# Patient Record
Sex: FEMALE | Race: WHITE | NOT HISPANIC OR LATINO | Employment: OTHER | URBAN - METROPOLITAN AREA
[De-identification: names, ages, dates, MRNs, and addresses within clinical notes are randomized per-mention and may not be internally consistent; named-entity substitution may affect disease eponyms.]

---

## 2017-09-18 ENCOUNTER — CONSULT (OUTPATIENT)
Dept: ONCOLOGY | Facility: CLINIC | Age: 77
End: 2017-09-18

## 2017-09-18 ENCOUNTER — EDUCATION (OUTPATIENT)
Dept: ONCOLOGY | Facility: HOSPITAL | Age: 77
End: 2017-09-18

## 2017-09-18 ENCOUNTER — DOCUMENTATION (OUTPATIENT)
Dept: OTHER | Facility: HOSPITAL | Age: 77
End: 2017-09-18

## 2017-09-18 VITALS
BODY MASS INDEX: 28.34 KG/M2 | HEIGHT: 62 IN | DIASTOLIC BLOOD PRESSURE: 70 MMHG | WEIGHT: 154 LBS | TEMPERATURE: 97.2 F | SYSTOLIC BLOOD PRESSURE: 150 MMHG | HEART RATE: 64 BPM | RESPIRATION RATE: 15 BRPM

## 2017-09-18 DIAGNOSIS — C34.90 SCLC (SMALL CELL LUNG CARCINOMA), UNSPECIFIED LATERALITY (HCC): Primary | ICD-10-CM

## 2017-09-18 PROBLEM — C34.12 CANCER OF UPPER LOBE OF LEFT LUNG (HCC): Status: ACTIVE | Noted: 2017-09-18

## 2017-09-18 PROCEDURE — 99205 OFFICE O/P NEW HI 60 MIN: CPT | Performed by: INTERNAL MEDICINE

## 2017-09-18 RX ORDER — DIPHENOXYLATE HYDROCHLORIDE AND ATROPINE SULFATE 2.5; .025 MG/1; MG/1
1 TABLET ORAL EVERY 6 HOURS PRN
Qty: 30 TABLET | Refills: 5 | OUTPATIENT
Start: 2017-09-18

## 2017-09-18 RX ORDER — CLOPIDOGREL BISULFATE 75 MG/1
75 TABLET ORAL DAILY
COMMUNITY

## 2017-09-18 RX ORDER — BISOPROLOL FUMARATE 5 MG/1
5 TABLET, FILM COATED ORAL DAILY
COMMUNITY

## 2017-09-18 RX ORDER — CETIRIZINE HYDROCHLORIDE 10 MG/1
10 TABLET ORAL DAILY
COMMUNITY

## 2017-09-18 RX ORDER — GABAPENTIN 100 MG/1
100 CAPSULE ORAL 3 TIMES DAILY
COMMUNITY

## 2017-09-18 RX ORDER — HYDROCHLOROTHIAZIDE 12.5 MG/1
12.5 CAPSULE, GELATIN COATED ORAL DAILY
COMMUNITY
End: 2017-11-20

## 2017-09-18 RX ORDER — SIMVASTATIN 20 MG
20 TABLET ORAL NIGHTLY
COMMUNITY

## 2017-09-18 RX ORDER — ONDANSETRON HYDROCHLORIDE 8 MG/1
8 TABLET, FILM COATED ORAL 3 TIMES DAILY PRN
Qty: 30 TABLET | Refills: 5 | Status: SHIPPED | OUTPATIENT
Start: 2017-09-18

## 2017-09-18 RX ORDER — FESOTERODINE FUMARATE 8 MG/1
TABLET, EXTENDED RELEASE ORAL
COMMUNITY

## 2017-09-18 RX ORDER — ASPIRIN 81 MG/1
81 TABLET ORAL DAILY
COMMUNITY

## 2017-09-18 RX ORDER — LISINOPRIL 40 MG/1
40 TABLET ORAL DAILY
COMMUNITY

## 2017-09-18 NOTE — PROGRESS NOTES
Met with patient, son, daughter-in-law and niece while in Med/Onc clinic. Introduced lung navigator role and provided contact information. She v/u. Previous patient Willie Mitchell was her nephew by marriage. She knows to call with questions or concerns. Nurse navigator available for any future needs.

## 2017-09-18 NOTE — PROGRESS NOTES
DATE OF CONSULTATION: 9/18/2017    REFERRING PHYSICIAN: Lyle Doshi DO    Dear Dr. Lyle Doshi,   Thank you for asking for my medical advice on this patient. I saw her in the  [facility] office on 9/18/2017    REASON FOR CONSULTATION: Left upper lobe small cell lung cancer T1a N0 M0 stage IA disease     HISTORY OF PRESENT ILLNESS: The patient is a very pleasant 76 y.o.  female   who was in her usual state of health until approximately 3 months. The patient presented with cough, nonproductive, associated with mild shortness of breath.  Given her chronic smoking she had CAT scan chest that revealed left upper lobe lung nodule.  This was followed by whole body PET scan that showed hypermetabolic activity in the lung nodule only.  Patient had wedge surgical resection done at [facility] by Dr. Thornton August 19, 2017, final pathology revealed small cell lung cancer with focally positive margin tumor size 1.7 cm.  Level IVR lymph node was negative.  The patient was referred to me for further recommendations.    SUBJECTIVE: When I saw the patient today she's doing fairly well.  She is complaining of mild fatigue.  She any headaches no night sweats she does have bilateral lower extremity edema.    Review of Systems   Constitutional: Positive for fatigue. Negative for activity change, appetite change, chills, fever and unexpected weight change.   HENT: Negative for hearing loss, mouth sores, nosebleeds, sore throat and trouble swallowing.    Eyes: Negative for visual disturbance.   Respiratory: Negative for cough, chest tightness, shortness of breath and wheezing.    Cardiovascular: Negative for chest pain, palpitations and leg swelling.   Gastrointestinal: Negative for abdominal distention, abdominal pain, blood in stool, constipation, diarrhea, nausea, rectal pain and vomiting.   Endocrine: Negative for cold intolerance and heat intolerance.   Genitourinary: Negative for difficulty urinating, dysuria,  frequency and urgency.   Musculoskeletal: Negative for arthralgias, back pain, gait problem, joint swelling and myalgias.   Skin: Negative for rash.   Neurological: Negative for dizziness, tremors, syncope, weakness, light-headedness, numbness and headaches.   Hematological: Negative for adenopathy. Does not bruise/bleed easily.   Psychiatric/Behavioral: Negative for confusion, sleep disturbance and suicidal ideas. The patient is not nervous/anxious.        Past Medical History:   Diagnosis Date   • Arthritis    • COPD (chronic obstructive pulmonary disease)    • Diabetes mellitus    • Hypertension    • Stroke        Social History     Social History   • Marital status:      Spouse name: N/A   • Number of children: N/A   • Years of education: N/A     Occupational History   • Not on file.     Social History Main Topics   • Smoking status: Current Some Day Smoker     Packs/day: 0.50     Years: 50.00     Types: Cigarettes   • Smokeless tobacco: Not on file   • Alcohol use Not on file   • Drug use: Not on file   • Sexual activity: Not on file     Other Topics Concern   • Not on file     Social History Narrative   • No narrative on file       No family history on file.    Past Surgical History:   Procedure Laterality Date   • HYSTERECTOMY  2003   • LUNG SURGERY  2017       No Known Allergies       Current Outpatient Prescriptions:   •  aspirin 81 MG EC tablet, Take 81 mg by mouth Daily., Disp: , Rfl:   •  bisoprolol (ZEBeta) 5 MG tablet, Take 5 mg by mouth Daily., Disp: , Rfl:   •  cetirizine (zyrTEC) 10 MG tablet, Take 10 mg by mouth Daily., Disp: , Rfl:   •  clopidogrel (PLAVIX) 75 MG tablet, Take 75 mg by mouth Daily., Disp: , Rfl:   •  fesoterodine fumarate (TOVIAZ ER) 8 MG tablet sustained-release 24 hour capsule, Take  by mouth Daily., Disp: , Rfl:   •  gabapentin (NEURONTIN) 100 MG capsule, Take 100 mg by mouth 3 (Three) Times a Day., Disp: , Rfl:   •  hydrochlorothiazide (MICROZIDE) 12.5 MG capsule, Take  12.5 mg by mouth Daily., Disp: , Rfl:   •  lisinopril (PRINIVIL,ZESTRIL) 40 MG tablet, Take 40 mg by mouth Daily., Disp: , Rfl:   •  simvastatin (ZOCOR) 20 MG tablet, Take 20 mg by mouth Every Night., Disp: , Rfl:   •  SITagliptin (JANUVIA) 100 MG tablet, Take 100 mg by mouth Daily., Disp: , Rfl:     PHYSICAL EXAMINATION:   /70  Pulse 64  Temp 97.2 °F (36.2 °C) (Temporal Artery )   Resp 15  Wt 154 lb (69.9 kg)   ECOG Performance Status: 1 - Symptomatic but completely ambulatory  General Appearance:  alert, cooperative, no apparent distress and appears stated age   Neurologic/Psychiatric: A&O x 3, gait steady, appropriate affect, strength 5/5 in all muscle groups   HEENT:  Normocephalic, without obvious abnormality, mucous membranes moist   Neck: Supple, symmetrical, trachea midline, no adenopathy;  No thyromegaly, masses, or tenderness   Lungs:   Clear to auscultation bilaterally; respirations regular, even, and unlabored bilaterally   Heart:  Regular rate and rhythm, no murmurs appreciated   Abdomen:   Soft, non-tender, non-distended and no organomegaly   Lymph nodes: No cervical, supraclavicular, inguinal or axillary adenopathy noted   Extremities: Normal, atraumatic; no clubbing, cyanosis, or edema    Skin: No rashes, ulcers, or suspicious lesions noted       No visits with results within 2 Week(s) from this visit.  Latest known visit with results is:    Hospital Outpatient Visit on 09/22/2015   Component Date Value Ref Range Status   • Glucose 09/22/2015 95  70 - 100 mg/dL Final   • WBC 09/22/2015 5.78  3.50 - 10.80 K/mcL Final   • RBC 09/22/2015 3.62* 3.89 - 5.14 M/mcL Final   • Hemoglobin 09/22/2015 11.1* 11.5 - 15.5 g/dL Final   • Hematocrit 09/22/2015 33.8* 34.5 - 44.0 % Final   • MCV 09/22/2015 93.4  80.0 - 99.0 fL Final   • MCH 09/22/2015 30.7  27.0 - 31.0 pg Final   • MCHC 09/22/2015 32.8  32.0 - 36.0 g/dL Final   • RDW-CV 09/22/2015 12.9  11.3 - 14.5 % Final   • Platelets 09/22/2015 150  150 -  450 K/mcL Final   • Glucose 09/22/2015 89  70 - 100 mg/dL Final   • BUN 09/22/2015 14  9 - 23 mg/dL Final   • Creatinine 09/22/2015 1.0  0.6 - 1.3 mg/dL Final   • Sodium 09/22/2015 135  132 - 146 mmol/L Final   • Potassium 09/22/2015 4.0  3.5 - 5.5 mmol/L Final   • Chloride 09/22/2015 97* 99 - 109 mmol/L Final   • CO2 09/22/2015 30  20 - 31 mmol/L Final   • Calcium 09/22/2015 9.7  8.7 - 10.4 mg/dL Final   • Est GFR by Clearance 09/22/2015 Unable to Calculate  ml/min/1.732 Final    Comment:    [employer] Guidelines          Stage         Description                    GFR        1                Normal or High               90+        2                Mild decrease                 60-89        3                Moderate decrease        30-59        4                Severe decrease            15-29        5                Kidney failure                  <15         • Anion Gap 09/22/2015 8  3 - 11 mmol/L Final        No results found.      DIAGNOSTIC DATA:   1. Radiology: Whole body PET scan done July 2017 reviewed by me showed hypermetabolic activity left upper lobe lung nodule   2. Dr. Thornton's note reviewed by me and documented in the chart.   3. Pathology report: August 17, 2017 left upper lobe wedge resection positive for small cell lung cancer chromogranin positive, CD 56 positive, and synaptophysin positive.   4. Laboratory data: As above     ASSESSMENT: The patient is a very pleasant 76 y.o.  female  with stage I a small cell lung cancer    PROBLEM LIST:   1.  Stage I a small cell lung cancer [id number]:  A.  Presented with shortening of breath and dry cough  B.  Whole body PET scan done July 17, 2017 revealed 2 cm hypermetabolic left upper lobe nodule  C.  Status post wedge resection done by Dr. Thornton August 16, 2017 final pathology revealed 1.7 cm small cell carcinoma with focally positive margin and one negative level IVR lymph node.  2.  Type 2 diabetes  3.  Hypertension  4.  Chronic  diarrhea    PLAN:   1. I had a long discussion today with the patient and her family including her son and daughter-in-law about her  new diagnosis of lung cancer. I did go over the final pathology report in  detail with both of them. I reviewed the patient's documents including refereing provider's notes, lab results, imaging, and path report.   2.  I reviewed the patient's PET scan pictures myself.  I shared with the patient and her family that the pathology report had a focal positive margin however that should not be an issue since her pathology showed small cell lung cancer and this is usually not surgically treated disease rather systemic treatment.  3.  The patient would be best treated with concurrent chemotherapy and radiation however given her age as well as comorbidities I really should be a good candidate for combined modality rather I'll start with chemotherapy alone and if she is doing fairly well I will consider adding radiation down the road or after completion chemotherapy.  4.  I will treat the patient with carboplatin and etoposide.  We'll avoid cisplatin given her age as well as fluid retention.  5. We reviewed the potential side effects of this regimen including fatigue, vomiting and nausea, hair loss, nephropathy, neuropathy, hearing loss, myelosuppression, risk of infusion reaction, and potential death.  6.  I'll start patient on Lomotil as needed for diarrhea.  7.  I will order MRI brain to complete her staging workup.  8.  I explained to the patient that she may need to have Port-A-Cath placement.  9.  I offered the patient to be treated closer to home since this is a very basic chemotherapy however she felt more comfortable getting treatment in [place].  She does have a granddaughter who lives here that she can stay with.  The patient will follow me in one week to get started on adjuvant treatment.  Damaris Martin MD  9/18/2017

## 2017-09-18 NOTE — PLAN OF CARE
Outpatient Infusion • [address] • [address] • [place] [address] • [phone]      CHEMOTHERAPY EDUCATION SHEET    NAME:  Nubia Gates      : 1940           DATE: 17    Booklets Given: Chemotherapy and You []  Eating Hints []    Sexuality/Fertility Books []     Chemotherapy/Biotherapy Education Sheets: (list all that apply)  Carboplatin + Etoposide                                                                                                                                                                 Chemotherapy Regimen:Carbo/Etoposide + Neulasta     TOPICS EDUCATION PROVIDED EDUCATION REINFORCED COMMENTS   ANEMIA:  role of RBC, cause, s/s, ways to manage, role of transfusion [x] []    THROMBOCYTOPENIA:  role of platelet, cause, s/s, ways to prevent bleeding, things to avoid, when to seek help [x] [] Discussed thrombocytopenia in relation to carboplatin.    NEUTROPENIA:  role of WBC, cause, infection precautions, s/s of infection, when to call MD [x] [] Discussed role of WBC, hand hygiene and count shaina.    NUTRITION & APPETITE CHANGES:  importance of maintaining healthy diet & weight, ways to manage to improve intake, dietary consult, exercise regimen [x] [] Taste changes   DIARRHEA:  causes, s/s of dehydration, ways to manage, dietary changes, when to call MD [] []    CONSTIPATION:  causes, ways to manage, dietary changes, when to call MD [] []    NAUSEA & VOMITING:  cause, use of antiemetics, dietary changes, when to call MD [x] [] Discussed use of zofran as PRN medication for N/V.    MOUTH SORES:  causes, oral care, ways to manage [x] []    ALOPECIA:  cause, ways to manage, resources [x] []    INFERTILITY & SEXUALITY:  causes, fertility preservation options, sexuality changes, ways to manage, importance of birth control [] []    NERVOUS SYSTEM CHANGES:  causes, s/s, neuropathies, cognitive changes, ways to manage [x] []    PAIN:  causes, ways to manage [] [] ????   SKIN  & NAIL CHANGES:  cause, s/s, ways to manage [x] []    ORGAN TOXICITIES:  cause, s/s, need for diagnostic tests, labs, when to notify MD [x] []    SURVIVORSHIP:  distress, distress assessment, secondary malignancies, early/late effects, follow-up, social issues, social support [] []    HOME CARE:  use of spill kits, storing of PO chemo, how to manage bodily fluids [] []    MISCELLANEOUS:  drug interactions, administration, vesicant, et [x] []        Notes:Spoke with patient and family today in clinic regarding treatment regimen Carbo/Etoposide. Also discussed use of neulasta. Discussed common side effects as listed above. Provided clinic contact number and instructions to call with questions or concerns. Patient to be counseled in infusion room prior to new start as well. Provided written handout materials for patient and family to review. All questions answered.

## 2017-09-20 ENCOUNTER — TELEPHONE (OUTPATIENT)
Dept: ONCOLOGY | Facility: CLINIC | Age: 77
End: 2017-09-20

## 2017-09-20 NOTE — TELEPHONE ENCOUNTER
Lis states that patient is receiving abx and IVF as well while inpatient, and is expected to be admitted for 2-3 days. Advised that we will keep everything as scheduled for now, but they are to call us back if she is still admitted so we can get everything rescheduled.

## 2017-09-20 NOTE — TELEPHONE ENCOUNTER
----- Message from Anastasiya Velásquez sent at 9/20/2017  8:14 AM EDT -----  Regarding: [facility] HOSPITALIZED  Contact: [phone]  AMY PALOMINO, DAUGHTER IN LAW OF PATIENT IS HOSPITALIZED AT [facility]  FOR ACUTE PANCREATITIS.      SHE IS SCHEDULED FOR AN MRI, AND TO FOLLOW UP WITH DR. FABIAN, THEN START HER TREATMENT.     IF SHE GETS DISCHARGED, SHOULD SHE STILL COME FOR THOSE APPOINTMENTS ON MONDAY? WILL SHE STILL START CHEMO? PLEASE CALL AMY AND LET HER KNOW WHAT THEY SHOULD DO.     OR CALL HOME AND LEAVE A MESSAGE [phone]

## 2017-09-25 ENCOUNTER — INFUSION (OUTPATIENT)
Dept: ONCOLOGY | Facility: HOSPITAL | Age: 77
End: 2017-09-25

## 2017-09-25 ENCOUNTER — OFFICE VISIT (OUTPATIENT)
Dept: ONCOLOGY | Facility: CLINIC | Age: 77
End: 2017-09-25

## 2017-09-25 ENCOUNTER — EDUCATION (OUTPATIENT)
Dept: ONCOLOGY | Facility: HOSPITAL | Age: 77
End: 2017-09-25

## 2017-09-25 ENCOUNTER — HOSPITAL ENCOUNTER (OUTPATIENT)
Dept: MRI IMAGING | Facility: HOSPITAL | Age: 77
Discharge: HOME | End: 2017-09-25
Attending: INTERNAL MEDICINE | Admitting: INTERNAL MEDICINE

## 2017-09-25 ENCOUNTER — DOCUMENTATION (OUTPATIENT)
Dept: NUTRITION | Facility: HOSPITAL | Age: 77
End: 2017-09-25

## 2017-09-25 VITALS
WEIGHT: 151 LBS | SYSTOLIC BLOOD PRESSURE: 195 MMHG | HEIGHT: 62 IN | HEART RATE: 58 BPM | TEMPERATURE: 97.5 F | RESPIRATION RATE: 18 BRPM | BODY MASS INDEX: 27.79 KG/M2 | DIASTOLIC BLOOD PRESSURE: 72 MMHG

## 2017-09-25 DIAGNOSIS — C34.90 SCLC (SMALL CELL LUNG CARCINOMA), UNSPECIFIED LATERALITY (HCC): Primary | ICD-10-CM

## 2017-09-25 DIAGNOSIS — C34.90 SCLC (SMALL CELL LUNG CARCINOMA), UNSPECIFIED LATERALITY (HCC): ICD-10-CM

## 2017-09-25 LAB
ALBUMIN SERPL-MCNC: 3.9 G/DL (ref 3.2–4.8)
ALBUMIN/GLOB SERPL: 1.6 G/DL (ref 1.5–2.5)
ALP SERPL-CCNC: 64 U/L (ref 25–100)
ALT SERPL W P-5'-P-CCNC: 13 U/L (ref 7–40)
ANION GAP SERPL CALCULATED.3IONS-SCNC: 4 MMOL/L (ref 3–11)
AST SERPL-CCNC: 19 U/L (ref 0–33)
BASOPHILS # BLD AUTO: 0.01 10*3/MM3 (ref 0–0.2)
BASOPHILS NFR BLD AUTO: 0.1 % (ref 0–1)
BILIRUB SERPL-MCNC: 0.4 MG/DL (ref 0.3–1.2)
BUN BLD-MCNC: 8 MG/DL (ref 9–23)
BUN/CREAT SERPL: 11.4 (ref 7–25)
CALCIUM SPEC-SCNC: 9.2 MG/DL (ref 8.7–10.4)
CHLORIDE SERPL-SCNC: 92 MMOL/L (ref 99–109)
CO2 SERPL-SCNC: 31 MMOL/L (ref 20–31)
CREAT BLD-MCNC: 0.7 MG/DL (ref 0.6–1.3)
CREAT BLDA-MCNC: 0.7 MG/DL (ref 0.6–1.3)
DEPRECATED RDW RBC AUTO: 44.1 FL (ref 37–54)
EOSINOPHIL # BLD AUTO: 0.1 10*3/MM3 (ref 0–0.3)
EOSINOPHIL NFR BLD AUTO: 1.4 % (ref 0–3)
ERYTHROCYTE [DISTWIDTH] IN BLOOD BY AUTOMATED COUNT: 13.6 % (ref 11.3–14.5)
GFR SERPL CREATININE-BSD FRML MDRD: 81 ML/MIN/1.73
GLOBULIN UR ELPH-MCNC: 2.5 GM/DL
GLUCOSE BLD-MCNC: 182 MG/DL (ref 70–100)
HCT VFR BLD AUTO: 34.5 % (ref 34.5–44)
HGB BLD-MCNC: 12.1 G/DL (ref 11.5–15.5)
IMM GRANULOCYTES # BLD: 0.01 10*3/MM3 (ref 0–0.03)
IMM GRANULOCYTES NFR BLD: 0.1 % (ref 0–0.6)
LYMPHOCYTES # BLD AUTO: 1.16 10*3/MM3 (ref 0.6–4.8)
LYMPHOCYTES NFR BLD AUTO: 16.2 % (ref 24–44)
MCH RBC QN AUTO: 31.3 PG (ref 27–31)
MCHC RBC AUTO-ENTMCNC: 35.1 G/DL (ref 32–36)
MCV RBC AUTO: 89.1 FL (ref 80–99)
MONOCYTES # BLD AUTO: 0.65 10*3/MM3 (ref 0–1)
MONOCYTES NFR BLD AUTO: 9.1 % (ref 0–12)
NEUTROPHILS # BLD AUTO: 5.25 10*3/MM3 (ref 1.5–8.3)
NEUTROPHILS NFR BLD AUTO: 73.1 % (ref 41–71)
PLATELET # BLD AUTO: 218 10*3/MM3 (ref 150–450)
PMV BLD AUTO: 8.7 FL (ref 6–12)
POTASSIUM BLD-SCNC: 4 MMOL/L (ref 3.5–5.5)
PROT SERPL-MCNC: 6.4 G/DL (ref 5.7–8.2)
RBC # BLD AUTO: 3.87 10*6/MM3 (ref 3.89–5.14)
SODIUM BLD-SCNC: 127 MMOL/L (ref 132–146)
WBC NRBC COR # BLD: 7.18 10*3/MM3 (ref 3.5–10.8)

## 2017-09-25 PROCEDURE — 25010000002 FOSAPREPITANT PER 1 MG: Performed by: INTERNAL MEDICINE

## 2017-09-25 PROCEDURE — 80053 COMPREHEN METABOLIC PANEL: CPT

## 2017-09-25 PROCEDURE — 96375 TX/PRO/DX INJ NEW DRUG ADDON: CPT

## 2017-09-25 PROCEDURE — 96413 CHEMO IV INFUSION 1 HR: CPT

## 2017-09-25 PROCEDURE — 96366 THER/PROPH/DIAG IV INF ADDON: CPT

## 2017-09-25 PROCEDURE — 25510000001 GADOBENATE DIMEGLUMINE 529 MG/ML SOLUTION: Performed by: INTERNAL MEDICINE

## 2017-09-25 PROCEDURE — 70553 MRI BRAIN STEM W/O & W/DYE: CPT

## 2017-09-25 PROCEDURE — 36415 COLL VENOUS BLD VENIPUNCTURE: CPT

## 2017-09-25 PROCEDURE — 25010000002 CARBOPLATIN PER 50 MG: Performed by: INTERNAL MEDICINE

## 2017-09-25 PROCEDURE — 96415 CHEMO IV INFUSION ADDL HR: CPT

## 2017-09-25 PROCEDURE — 82565 ASSAY OF CREATININE: CPT

## 2017-09-25 PROCEDURE — A9577 INJ MULTIHANCE: HCPCS | Performed by: INTERNAL MEDICINE

## 2017-09-25 PROCEDURE — 25010000002 PALONOSETRON PER 25 MCG: Performed by: INTERNAL MEDICINE

## 2017-09-25 PROCEDURE — 25010000002 ETOPOSIDE 100 MG/5ML SOLUTION 50 ML VIAL: Performed by: INTERNAL MEDICINE

## 2017-09-25 PROCEDURE — 99215 OFFICE O/P EST HI 40 MIN: CPT | Performed by: INTERNAL MEDICINE

## 2017-09-25 PROCEDURE — 96367 TX/PROPH/DG ADDL SEQ IV INF: CPT

## 2017-09-25 PROCEDURE — 25010000002 DEXAMETHASONE PER 1 MG: Performed by: INTERNAL MEDICINE

## 2017-09-25 PROCEDURE — 85025 COMPLETE CBC W/AUTO DIFF WBC: CPT

## 2017-09-25 PROCEDURE — 96417 CHEMO IV INFUS EACH ADDL SEQ: CPT

## 2017-09-25 RX ORDER — PALONOSETRON 0.05 MG/ML
0.25 INJECTION, SOLUTION INTRAVENOUS ONCE
Status: CANCELLED | OUTPATIENT
Start: 2017-09-25

## 2017-09-25 RX ORDER — SODIUM CHLORIDE 9 MG/ML
250 INJECTION, SOLUTION INTRAVENOUS ONCE
Status: DISCONTINUED | OUTPATIENT
Start: 2017-09-25 | End: 2017-09-25 | Stop reason: HOSPADM

## 2017-09-25 RX ORDER — SODIUM CHLORIDE 9 MG/ML
250 INJECTION, SOLUTION INTRAVENOUS ONCE
Status: CANCELLED | OUTPATIENT
Start: 2017-10-23

## 2017-09-25 RX ORDER — SODIUM CHLORIDE 9 MG/ML
250 INJECTION, SOLUTION INTRAVENOUS ONCE
Status: CANCELLED | OUTPATIENT
Start: 2017-09-25

## 2017-09-25 RX ORDER — SODIUM CHLORIDE 9 MG/ML
250 INJECTION, SOLUTION INTRAVENOUS ONCE
Status: CANCELLED | OUTPATIENT
Start: 2017-10-25

## 2017-09-25 RX ORDER — PALONOSETRON 0.05 MG/ML
0.25 INJECTION, SOLUTION INTRAVENOUS ONCE
Status: COMPLETED | OUTPATIENT
Start: 2017-09-25 | End: 2017-09-25

## 2017-09-25 RX ORDER — SODIUM CHLORIDE 9 MG/ML
250 INJECTION, SOLUTION INTRAVENOUS ONCE
Status: CANCELLED | OUTPATIENT
Start: 2017-09-27

## 2017-09-25 RX ORDER — SODIUM CHLORIDE 9 MG/ML
250 INJECTION, SOLUTION INTRAVENOUS ONCE
Status: CANCELLED | OUTPATIENT
Start: 2017-09-26

## 2017-09-25 RX ORDER — PALONOSETRON 0.05 MG/ML
0.25 INJECTION, SOLUTION INTRAVENOUS ONCE
Status: CANCELLED | OUTPATIENT
Start: 2017-10-23

## 2017-09-25 RX ORDER — SODIUM CHLORIDE 9 MG/ML
250 INJECTION, SOLUTION INTRAVENOUS ONCE
Status: CANCELLED | OUTPATIENT
Start: 2017-10-24

## 2017-09-25 RX ADMIN — GADOBENATE DIMEGLUMINE 10 ML: 529 INJECTION, SOLUTION INTRAVENOUS at 08:25

## 2017-09-25 RX ADMIN — DEXAMETHASONE SODIUM PHOSPHATE 12 MG: 4 INJECTION, SOLUTION INTRAMUSCULAR; INTRAVENOUS at 11:56

## 2017-09-25 RX ADMIN — CARBOPLATIN 490 MG: 10 INJECTION, SOLUTION INTRAVENOUS at 13:27

## 2017-09-25 RX ADMIN — PALONOSETRON HYDROCHLORIDE 0.25 MG: 0.25 INJECTION INTRAVENOUS at 11:55

## 2017-09-25 RX ADMIN — SODIUM CHLORIDE 150 MG: 9 INJECTION, SOLUTION INTRAVENOUS at 11:56

## 2017-09-25 RX ADMIN — ETOPOSIDE 170 MG: 20 INJECTION, SOLUTION, CONCENTRATE INTRAVENOUS at 12:21

## 2017-09-25 NOTE — PROGRESS NOTES
DATE OF VISIT: 9/25/2017    REASON FOR VISIT: Followup for limited stage small cell lung cancer     HISTORY OF PRESENT ILLNESS: The patient is a very pleasant 76 y.o. female  with past medical history significant for limited stage small cell lung cancer diagnosed August 19, 2017.  The patient status post wedge resection of left upper lobe mass done by Dr. Thornton, final pathology revealed 1.7 cm small cell lung cancer with positive surgical margin and one negative level IVR lymph node.  The patient was started on adjuvant chemotherapy using carboplatin and etoposide September 25, 2017. The patient is here today for cycle #1, day 1.    SUBJECTIVE: The patient has been doing fairly well. she is able to tolerate  her treatment without any serious side effects. she denied any fever or  chills, no night sweats, denied any headaches    PAST MEDICAL HISTORY/SOCIAL HISTORY/FAMILY HISTORY: Unchanged from my prior documentation done on September 18, 2017    Review of Systems   Constitutional: Negative for activity change, appetite change, chills, fatigue, fever and unexpected weight change.   HENT: Negative for hearing loss, mouth sores, nosebleeds, sore throat and trouble swallowing.    Eyes: Negative for visual disturbance.   Respiratory: Negative for cough, chest tightness, shortness of breath and wheezing.    Cardiovascular: Negative for chest pain, palpitations and leg swelling.   Gastrointestinal: Negative for abdominal distention, abdominal pain, blood in stool, constipation, diarrhea, nausea, rectal pain and vomiting.   Endocrine: Negative for cold intolerance and heat intolerance.   Genitourinary: Negative for difficulty urinating, dysuria, frequency and urgency.   Musculoskeletal: Negative for arthralgias, back pain, gait problem, joint swelling and myalgias.   Skin: Negative for rash.   Neurological: Negative for dizziness, tremors, syncope, weakness, light-headedness, numbness and headaches.   Hematological:  "Negative for adenopathy. Does not bruise/bleed easily.   Psychiatric/Behavioral: Negative for confusion, sleep disturbance and suicidal ideas. The patient is not nervous/anxious.          Current Outpatient Prescriptions:   •  aspirin 81 MG EC tablet, Take 81 mg by mouth Daily., Disp: , Rfl:   •  bisoprolol (ZEBeta) 5 MG tablet, Take 5 mg by mouth Daily., Disp: , Rfl:   •  cetirizine (zyrTEC) 10 MG tablet, Take 10 mg by mouth Daily., Disp: , Rfl:   •  clopidogrel (PLAVIX) 75 MG tablet, Take 75 mg by mouth Daily., Disp: , Rfl:   •  diphenoxylate-atropine (LOMOTIL) 2.5-0.025 MG per tablet, Take 1 tablet by mouth Every 6 (Six) Hours As Needed for Diarrhea. 1 tablet, Disp: 30 tablet, Rfl: 5  •  fesoterodine fumarate (TOVIAZ ER) 8 MG tablet sustained-release 24 hour capsule, Take  by mouth Daily., Disp: , Rfl:   •  gabapentin (NEURONTIN) 100 MG capsule, Take 100 mg by mouth 3 (Three) Times a Day., Disp: , Rfl:   •  hydrochlorothiazide (MICROZIDE) 12.5 MG capsule, Take 12.5 mg by mouth Daily., Disp: , Rfl:   •  lisinopril (PRINIVIL,ZESTRIL) 40 MG tablet, Take 40 mg by mouth Daily., Disp: , Rfl:   •  ondansetron (ZOFRAN) 8 MG tablet, Take 1 tablet by mouth 3 (Three) Times a Day As Needed for Nausea or Vomiting., Disp: 30 tablet, Rfl: 5  •  simvastatin (ZOCOR) 20 MG tablet, Take 20 mg by mouth Every Night., Disp: , Rfl:   •  SITagliptin (JANUVIA) 100 MG tablet, Take 100 mg by mouth Daily., Disp: , Rfl:   No current facility-administered medications for this visit.     PHYSICAL EXAMINATION:   BP (!) 195/72 Comment: LUE  Pulse 58  Temp 97.5 °F (36.4 °C) (Temporal Artery )   Resp 18  Ht 62\" (157.5 cm)  Wt 151 lb (68.5 kg)  BMI 27.62 kg/m2   ECOG Performance Status: 0 - Asymptomatic  General Appearance:  alert, cooperative, no apparent distress and appears stated age   Neurologic/Psychiatric: A&O x 3, gait steady, appropriate affect, strength 5/5 in all muscle groups   HEENT:  Normocephalic, without obvious abnormality, " mucous membranes moist   Neck: Supple, symmetrical, trachea midline, no adenopathy;  No thyromegaly, masses, or tenderness   Lungs:   Clear to auscultation bilaterally; respirations regular, even, and unlabored bilaterally   Heart:  Regular rate and rhythm, no murmurs appreciated   Abdomen:   Soft, non-tender, non-distended and no organomegaly   Lymph nodes: No cervical, supraclavicular, inguinal or axillary adenopathy noted   Extremities: Normal, atraumatic; no clubbing, cyanosis, or edema    Skin: No rashes, ulcers, or suspicious lesions noted     Infusion on 09/25/2017   Component Date Value Ref Range Status   • WBC 09/25/2017 7.18  3.50 - 10.80 10*3/mm3 Final   • RBC 09/25/2017 3.87* 3.89 - 5.14 10*6/mm3 Final   • Hemoglobin 09/25/2017 12.1  11.5 - 15.5 g/dL Final   • Hematocrit 09/25/2017 34.5  34.5 - 44.0 % Final   • MCV 09/25/2017 89.1  80.0 - 99.0 fL Final   • MCH 09/25/2017 31.3* 27.0 - 31.0 pg Final   • MCHC 09/25/2017 35.1  32.0 - 36.0 g/dL Final   • RDW 09/25/2017 13.6  11.3 - 14.5 % Final   • RDW-SD 09/25/2017 44.1  37.0 - 54.0 fl Final   • MPV 09/25/2017 8.7  6.0 - 12.0 fL Final   • Platelets 09/25/2017 218  150 - 450 10*3/mm3 Final   • Neutrophil % 09/25/2017 73.1* 41.0 - 71.0 % Final   • Lymphocyte % 09/25/2017 16.2* 24.0 - 44.0 % Final   • Monocyte % 09/25/2017 9.1  0.0 - 12.0 % Final   • Eosinophil % 09/25/2017 1.4  0.0 - 3.0 % Final   • Basophil % 09/25/2017 0.1  0.0 - 1.0 % Final   • Immature Grans % 09/25/2017 0.1  0.0 - 0.6 % Final   • Neutrophils, Absolute 09/25/2017 5.25  1.50 - 8.30 10*3/mm3 Final   • Lymphocytes, Absolute 09/25/2017 1.16  0.60 - 4.80 10*3/mm3 Final   • Monocytes, Absolute 09/25/2017 0.65  0.00 - 1.00 10*3/mm3 Final   • Eosinophils, Absolute 09/25/2017 0.10  0.00 - 0.30 10*3/mm3 Final   • Basophils, Absolute 09/25/2017 0.01  0.00 - 0.20 10*3/mm3 Final   • Immature Grans, Absolute 09/25/2017 0.01  0.00 - 0.03 10*3/mm3 Final        No results found.    ASSESSMENT: The patient  is a very pleasant 76 y.o. female  with Limited stage small cell lung cancer    PROBLEM LIST:  1.Stage I a small cell lung cancer [id number]:  A.  Presented with shortening of breath and dry cough  B.  Whole body PET scan done July 17, 2017 revealed 2 cm hypermetabolic left upper lobe nodule  C.  Status post wedge resection done by Dr. Thornton August 16, 2017 pathology revealed 1.7 cm small cell carcinoma with focally positive margin and one negative level IVR lymph node.  D. started adjuvant chemotherapy with carboplatin and etoposide September 25, 2017  2.  Type 2 diabetes  3.  Hypertension  4.  Chronic diarrhea     PLAN:  1.  I would proceed with chemotherapy as scheduled today.  2.  The patient will follow up with me in 4 weeks for cycle #2 out of 4 planned.  3.  I'll monitor patient blood work including blood counts kidney failure function.  4. We reviewed the potential side effects of this regimen including fatigue, vomiting and nausea, hair loss, nephropathy, neuropathy, hearing loss, myelosuppression, risk of infusion reaction, and potential death.  5.  I will start patient on Zofran as needed for chemotherapy-induced nausea.  6.  I'll repeat her scans after completion of chemotherapy to consider adjuvant radiation at that point.  Patient is not a candidate for concurrent treatment modality given her age as well as borderline performance status.  7.  I'll consider restarting patient on Lasix if her lower extremity edema to worsen.  8.  I did go over the MRI results with the patient and her family, reviewed the films myself, there is no evidence of metastatic disease.  I'll follow-up on the official read by the radiologist.  9.  We'll consider inserting Port-A-Cath if needed.  10.  We'll continue Imodium as well as Lomotil as needed for diarrhea.    Damaris Martin MD  9/25/2017

## 2017-09-25 NOTE — PROGRESS NOTES
Oncology Nutrition Screening    Patient Name:  Nubia Gates  YOB: 1940  MRN: [id number]  Date:  09/25/17  Physician:  Dr. Martin    Type of Cancer Treatment:   Surgery:   Wedge resection 8/16/17  Chemotherapy:  Carbo(D1) / VP16(D1-3) - every 28 days x 4 cycles    Patient Active Problem List   Diagnosis   • Cancer of upper lobe of left lung   • SCLC (small cell lung carcinoma)       Current Outpatient Prescriptions   Medication Sig Dispense Refill   • aspirin 81 MG EC tablet Take 81 mg by mouth Daily.     • bisoprolol (ZEBeta) 5 MG tablet Take 5 mg by mouth Daily.     • cetirizine (zyrTEC) 10 MG tablet Take 10 mg by mouth Daily.     • clopidogrel (PLAVIX) 75 MG tablet Take 75 mg by mouth Daily.     • diphenoxylate-atropine (LOMOTIL) 2.5-0.025 MG per tablet Take 1 tablet by mouth Every 6 (Six) Hours As Needed for Diarrhea. 1 tablet 30 tablet 5   • fesoterodine fumarate (TOVIAZ ER) 8 MG tablet sustained-release 24 hour capsule Take  by mouth Daily.     • gabapentin (NEURONTIN) 100 MG capsule Take 100 mg by mouth 3 (Three) Times a Day.     • hydrochlorothiazide (MICROZIDE) 12.5 MG capsule Take 12.5 mg by mouth Daily.     • lisinopril (PRINIVIL,ZESTRIL) 40 MG tablet Take 40 mg by mouth Daily.     • ondansetron (ZOFRAN) 8 MG tablet Take 1 tablet by mouth 3 (Three) Times a Day As Needed for Nausea or Vomiting. 30 tablet 5   • simvastatin (ZOCOR) 20 MG tablet Take 20 mg by mouth Every Night.     • SITagliptin (JANUVIA) 100 MG tablet Take 100 mg by mouth Daily.       No current facility-administered medications for this visit.      Facility-Administered Medications Ordered in Other Visits   Medication Dose Route Frequency Provider Last Rate Last Dose   • CARBOplatin (PARAPLATIN) 490 mg in sodium chloride 0.9 % 250 mL chemo IVPB  490 mg Intravenous Once Damaris Martin MD       • etoposide (TOPOSAR) 170 mg in sodium chloride 0.9 % 500 mL chemo IVPB  100 mg/m2 (Treatment Plan Recorded) Intravenous Once Damaris NAVA  MD Veronica   170 mg at 09/25/17 1221   • sodium chloride 0.9 % infusion 250 mL  250 mL Intravenous Once Damaris Martin MD           Glycemic Risk:   NIDDM    Weight:   Height: 62 inches  Weight: 151 lbs.  Usual Body Weight: ~150 lbs.   BMI: 27.6  Overweight  Weight - no significant changes    Oral Food Intake:  Regular Diet - No Restrictions    Hydration Status:   How many 8 ounce glass of water of fluid do you drink per day?  Specific amount not assessed at this time.    Enteral Feeding:   n/a    Nutrition Symptoms:   Altered Apetite  Nausea  Constipation  Diarrhea    Activity:   Normal with no limitations     reports that she has been smoking Cigarettes.  She has a 25.00 pack-year smoking history. She has never used smokeless tobacco. She reports that she does not drink alcohol or use illicit drugs.    Evaluation of Nutritional Risk:   Patient has been identified at nutritional risk due to diagnosis, treatment plan, and nutrition impact symptoms.  Met with patient and her daughter in law during her initial chemotherapy infusion appointment.  They report her appetite/oral intake has been somewhat decreased; they state she has had an increase in nausea and sometimes vomiting; and they report she fluctuates between constipation and diarrhea with all of these symptoms starting about 1 year ago.  Note her weight has remained stable.      Discussed the importance of good nutrition during her treatment course focusing on adequate calorie, protein, nutrient and fluid intake to maintain her nutritional status.  Recommended she be consuming smaller more frequent meals/snacks throughout the day to aid with nausea management.  Emphasized the importance of protein and its role in the diet; reviewed high protein foods; and recommended she have a protein source at each meal/snack.  Also emphasized the importance of hydration; reviewed good hydrating fluid options; and recommended she increase her consumption of hydrating fluids.   "Discussed nutritional supplements and their role in the diet and provided her with samples and coupons for Boost and Ensure products, plus a recipe guide.  Provided the new chemo patient packet and reviewed the ACS nutrition booklet and suggested they use if for symptom management as needed.  Also provided written diet material \"Soft and Moist High Protein Menu Ideas\".    Answered their questions and both voiced understanding of information discussed.  RD's contact information provided and encouraged them to call if further questions or concerns arise.  Will monitor as needed during her treatment course.      Electronically signed by:  Zulma Acuna RD  1:19 PM  "

## 2017-09-25 NOTE — PLAN OF CARE
Outpatient Infusion • [address] [facility] • [address] • [place] [address] • [phone]      CHEMOTHERAPY EDUCATION SHEET    NAME:  Nubia Gates      : 1940           DATE: 17    Booklets Given: Chemotherapy and You []  Eating Hints []    Sexuality/Fertility Books []     Chemotherapy/Biotherapy Education Sheets: (list all that apply)  Carboplatin + Etoposide                                                                                                                                                                 Chemotherapy Regimen:Carbo/Etoposide + Neulasta     TOPICS EDUCATION PROVIDED EDUCATION REINFORCED COMMENTS   ANEMIA:  role of RBC, cause, s/s, ways to manage, role of transfusion [x] []    THROMBOCYTOPENIA:  role of platelet, cause, s/s, ways to prevent bleeding, things to avoid, when to seek help [x] [] Discussed thrombocytopenia in relation to carboplatin.    NEUTROPENIA:  role of WBC, cause, infection precautions, s/s of infection, when to call MD [x] [] Discussed role of WBC, hand hygiene and count shaina.    NUTRITION & APPETITE CHANGES:  importance of maintaining healthy diet & weight, ways to manage to improve intake, dietary consult, exercise regimen [x] [] Taste changes   DIARRHEA:  causes, s/s of dehydration, ways to manage, dietary changes, when to call MD [] []    CONSTIPATION:  causes, ways to manage, dietary changes, when to call MD [] []    NAUSEA & VOMITING:  cause, use of antiemetics, dietary changes, when to call MD [x] [] Discussed use of zofran as PRN medication for N/V.    MOUTH SORES:  causes, oral care, ways to manage [x] []    ALOPECIA:  cause, ways to manage, resources [x] []    INFERTILITY & SEXUALITY:  causes, fertility preservation options, sexuality changes, ways to manage, importance of birth control [] []    NERVOUS SYSTEM CHANGES:  causes, s/s, neuropathies, cognitive changes, ways to manage [x] []    PAIN:  causes, ways to manage [] [] ????   SKIN  & NAIL CHANGES:  cause, s/s, ways to manage [x] []    ORGAN TOXICITIES:  cause, s/s, need for diagnostic tests, labs, when to notify MD [x] []    SURVIVORSHIP:  distress, distress assessment, secondary malignancies, early/late effects, follow-up, social issues, social support [] []    HOME CARE:  use of spill kits, storing of PO chemo, how to manage bodily fluids [] []    MISCELLANEOUS:  drug interactions, administration, vesicant, et [x] []        Notes:Spoke with patient and family today in infusion regarding the initiation of treatment. Carbo/Etoposide.Discussed common side effects as listed above. Provided clinic contact number and instructions to call with questions or concerns.  Provided written handout materials for patient and family to review. All questions answered.

## 2017-09-26 ENCOUNTER — DOCUMENTATION (OUTPATIENT)
Dept: SOCIAL WORK | Facility: HOSPITAL | Age: 77
End: 2017-09-26

## 2017-09-26 ENCOUNTER — INFUSION (OUTPATIENT)
Dept: ONCOLOGY | Facility: HOSPITAL | Age: 77
End: 2017-09-26

## 2017-09-26 VITALS
RESPIRATION RATE: 18 BRPM | BODY MASS INDEX: 28.34 KG/M2 | HEIGHT: 62 IN | SYSTOLIC BLOOD PRESSURE: 147 MMHG | WEIGHT: 154 LBS | TEMPERATURE: 98.1 F | HEART RATE: 81 BPM | DIASTOLIC BLOOD PRESSURE: 61 MMHG

## 2017-09-26 DIAGNOSIS — C34.90 SCLC (SMALL CELL LUNG CARCINOMA), UNSPECIFIED LATERALITY (HCC): Primary | ICD-10-CM

## 2017-09-26 LAB — CREAT BLDA-MCNC: 0.7 MG/DL (ref 0.6–1.3)

## 2017-09-26 PROCEDURE — 96413 CHEMO IV INFUSION 1 HR: CPT

## 2017-09-26 PROCEDURE — 96375 TX/PRO/DX INJ NEW DRUG ADDON: CPT

## 2017-09-26 PROCEDURE — 25010000002 ETOPOSIDE 100 MG/5ML SOLUTION 50 ML VIAL: Performed by: INTERNAL MEDICINE

## 2017-09-26 PROCEDURE — 25010000002 DEXAMETHASONE PER 1 MG: Performed by: INTERNAL MEDICINE

## 2017-09-26 RX ORDER — SODIUM CHLORIDE 9 MG/ML
250 INJECTION, SOLUTION INTRAVENOUS ONCE
Status: COMPLETED | OUTPATIENT
Start: 2017-09-26 | End: 2017-09-26

## 2017-09-26 RX ADMIN — DEXAMETHASONE SODIUM PHOSPHATE 12 MG: 4 INJECTION, SOLUTION INTRAMUSCULAR; INTRAVENOUS at 13:23

## 2017-09-26 RX ADMIN — ETOPOSIDE 170 MG: 20 INJECTION, SOLUTION, CONCENTRATE INTRAVENOUS at 13:41

## 2017-09-26 RX ADMIN — SODIUM CHLORIDE 250 ML: 9 INJECTION, SOLUTION INTRAVENOUS at 13:23

## 2017-09-26 NOTE — PROGRESS NOTES
SW met with pt during first infusion to provide support and resources and to address distress screening score of 10.  Pt states that she was very nervous about her appointment today but that she feels much better now that she is here and that her treatment is underway.  Pt states that she did not know what to expect from her chemo treatment and this caused her much anxiety.  SW provided support to pt and educated pt about oncology social work services.  Pt is from the [facility], but stayed with her daughter in [place] last night so that she could avoid have to get up so early.  SW provided contact information for future needs or concerns.  SW available for ongoing support and resource needs.

## 2017-09-27 ENCOUNTER — TELEPHONE (OUTPATIENT)
Dept: ONCOLOGY | Facility: CLINIC | Age: 77
End: 2017-09-27

## 2017-09-27 ENCOUNTER — INFUSION (OUTPATIENT)
Dept: ONCOLOGY | Facility: HOSPITAL | Age: 77
End: 2017-09-27

## 2017-09-27 VITALS
RESPIRATION RATE: 16 BRPM | DIASTOLIC BLOOD PRESSURE: 75 MMHG | HEART RATE: 73 BPM | WEIGHT: 154 LBS | TEMPERATURE: 97.4 F | HEIGHT: 62 IN | SYSTOLIC BLOOD PRESSURE: 191 MMHG | BODY MASS INDEX: 28.34 KG/M2

## 2017-09-27 DIAGNOSIS — C34.90 SCLC (SMALL CELL LUNG CARCINOMA), UNSPECIFIED LATERALITY (HCC): Primary | ICD-10-CM

## 2017-09-27 PROCEDURE — 25010000002 PEGFILGRASTIM 6 MG/0.6ML PREFILLED SYRINGE KIT: Performed by: INTERNAL MEDICINE

## 2017-09-27 PROCEDURE — 25010000002 DEXAMETHASONE PER 1 MG: Performed by: INTERNAL MEDICINE

## 2017-09-27 PROCEDURE — 96377 APPLICATON ON-BODY INJECTOR: CPT

## 2017-09-27 PROCEDURE — 96413 CHEMO IV INFUSION 1 HR: CPT

## 2017-09-27 PROCEDURE — 96375 TX/PRO/DX INJ NEW DRUG ADDON: CPT

## 2017-09-27 PROCEDURE — 25010000002 ETOPOSIDE 100 MG/5ML SOLUTION 50 ML VIAL: Performed by: INTERNAL MEDICINE

## 2017-09-27 RX ORDER — SODIUM CHLORIDE 9 MG/ML
250 INJECTION, SOLUTION INTRAVENOUS ONCE
Status: COMPLETED | OUTPATIENT
Start: 2017-09-27 | End: 2017-09-27

## 2017-09-27 RX ADMIN — PEGFILGRASTIM 6 MG: KIT SUBCUTANEOUS at 15:29

## 2017-09-27 RX ADMIN — SODIUM CHLORIDE 250 ML: 9 INJECTION, SOLUTION INTRAVENOUS at 13:41

## 2017-09-27 RX ADMIN — ETOPOSIDE 170 MG: 20 INJECTION, SOLUTION, CONCENTRATE INTRAVENOUS at 14:13

## 2017-09-27 RX ADMIN — DEXAMETHASONE SODIUM PHOSPHATE 12 MG: 4 INJECTION, SOLUTION INTRAMUSCULAR; INTRAVENOUS at 13:43

## 2017-09-27 NOTE — TELEPHONE ENCOUNTER
----- Message from Jia Chairez RN sent at 9/27/2017  3:26 PM EDT -----  Regarding: /ELROY  PT's sats at resting 88% and walking 83%. Pls call, [phone] [phone]

## 2017-09-27 NOTE — PROGRESS NOTES
Pt reported SOA after treatment. Pt's O2 saturation sitting 88% and O2 saturation walking 83%. Reported to , home oxygen being ordered.

## 2017-10-02 ENCOUNTER — TELEPHONE (OUTPATIENT)
Dept: ONCOLOGY | Facility: CLINIC | Age: 77
End: 2017-10-02

## 2017-10-02 NOTE — TELEPHONE ENCOUNTER
----- Message from Arabella BARKER Dago sent at 10/2/2017 12:34 PM EDT -----  Regarding: CAREN-ORDER  Contact: [phone]  Lis patient's daughter in law called and said [employer] company called and said they have to have an order signed by Dr. Martin not the nurse for insurance purposes it also needs to say she had COPD and to add  A humidifier kit to it as well. This can be faxed to [phone].

## 2017-10-23 ENCOUNTER — LAB (OUTPATIENT)
Dept: LAB | Facility: HOSPITAL | Age: 77
End: 2017-10-23

## 2017-10-23 ENCOUNTER — OFFICE VISIT (OUTPATIENT)
Dept: ONCOLOGY | Facility: CLINIC | Age: 77
End: 2017-10-23

## 2017-10-23 ENCOUNTER — INFUSION (OUTPATIENT)
Dept: ONCOLOGY | Facility: HOSPITAL | Age: 77
End: 2017-10-23

## 2017-10-23 VITALS
DIASTOLIC BLOOD PRESSURE: 70 MMHG | HEIGHT: 62 IN | SYSTOLIC BLOOD PRESSURE: 145 MMHG | RESPIRATION RATE: 18 BRPM | HEART RATE: 75 BPM | WEIGHT: 148 LBS | TEMPERATURE: 97.8 F | BODY MASS INDEX: 27.23 KG/M2

## 2017-10-23 DIAGNOSIS — C34.90 SCLC (SMALL CELL LUNG CARCINOMA), UNSPECIFIED LATERALITY (HCC): Primary | ICD-10-CM

## 2017-10-23 DIAGNOSIS — C34.90 SCLC (SMALL CELL LUNG CARCINOMA), UNSPECIFIED LATERALITY (HCC): ICD-10-CM

## 2017-10-23 DIAGNOSIS — C34.90 SMALL CELL CARCINOMA OF LUNG, UNSPECIFIED LATERALITY: Primary | ICD-10-CM

## 2017-10-23 LAB
ALBUMIN SERPL-MCNC: 3.6 G/DL (ref 3.2–4.8)
ALBUMIN/GLOB SERPL: 1.4 G/DL (ref 1.5–2.5)
ALP SERPL-CCNC: 69 U/L (ref 25–100)
ALT SERPL W P-5'-P-CCNC: 7 U/L (ref 7–40)
ANION GAP SERPL CALCULATED.3IONS-SCNC: 4 MMOL/L (ref 3–11)
AST SERPL-CCNC: 12 U/L (ref 0–33)
BILIRUB SERPL-MCNC: 0.3 MG/DL (ref 0.3–1.2)
BUN BLD-MCNC: 8 MG/DL (ref 9–23)
BUN/CREAT SERPL: 10 (ref 7–25)
CALCIUM SPEC-SCNC: 8.9 MG/DL (ref 8.7–10.4)
CHLORIDE SERPL-SCNC: 102 MMOL/L (ref 99–109)
CO2 SERPL-SCNC: 31 MMOL/L (ref 20–31)
CREAT BLD-MCNC: 0.8 MG/DL (ref 0.6–1.3)
ERYTHROCYTE [DISTWIDTH] IN BLOOD BY AUTOMATED COUNT: 16.2 % (ref 11.3–14.5)
GFR SERPL CREATININE-BSD FRML MDRD: 70 ML/MIN/1.73
GLOBULIN UR ELPH-MCNC: 2.6 GM/DL
GLUCOSE BLD-MCNC: 175 MG/DL (ref 70–100)
HCT VFR BLD AUTO: 30.9 % (ref 34.5–44)
HGB BLD-MCNC: 9.7 G/DL (ref 11.5–15.5)
LYMPHOCYTES # BLD AUTO: 1.4 10*3/MM3 (ref 0.6–4.8)
LYMPHOCYTES NFR BLD AUTO: 16.6 % (ref 24–44)
MCH RBC QN AUTO: 29.2 PG (ref 27–31)
MCHC RBC AUTO-ENTMCNC: 31.4 G/DL (ref 32–36)
MCV RBC AUTO: 92.9 FL (ref 80–99)
MONOCYTES # BLD AUTO: 0.2 10*3/MM3 (ref 0–1)
MONOCYTES NFR BLD AUTO: 3 % (ref 0–12)
NEUTROPHILS # BLD AUTO: 6.6 10*3/MM3 (ref 1.5–8.3)
NEUTROPHILS NFR BLD AUTO: 80.4 % (ref 41–71)
PLATELET # BLD AUTO: 523 10*3/MM3 (ref 150–450)
PMV BLD AUTO: 6.7 FL (ref 6–12)
POTASSIUM BLD-SCNC: 3.8 MMOL/L (ref 3.5–5.5)
PROT SERPL-MCNC: 6.2 G/DL (ref 5.7–8.2)
RBC # BLD AUTO: 3.33 10*6/MM3 (ref 3.89–5.14)
SODIUM BLD-SCNC: 137 MMOL/L (ref 132–146)
WBC NRBC COR # BLD: 8.2 10*3/MM3 (ref 3.5–10.8)

## 2017-10-23 PROCEDURE — 25010000002 FOSAPREPITANT PER 1 MG: Performed by: INTERNAL MEDICINE

## 2017-10-23 PROCEDURE — 96367 TX/PROPH/DG ADDL SEQ IV INF: CPT

## 2017-10-23 PROCEDURE — 96375 TX/PRO/DX INJ NEW DRUG ADDON: CPT

## 2017-10-23 PROCEDURE — 25010000002 PALONOSETRON PER 25 MCG: Performed by: INTERNAL MEDICINE

## 2017-10-23 PROCEDURE — 25010000002 DEXAMETHASONE PER 1 MG: Performed by: INTERNAL MEDICINE

## 2017-10-23 PROCEDURE — 25010000002 CARBOPLATIN PER 50 MG: Performed by: INTERNAL MEDICINE

## 2017-10-23 PROCEDURE — 96417 CHEMO IV INFUS EACH ADDL SEQ: CPT

## 2017-10-23 PROCEDURE — 36415 COLL VENOUS BLD VENIPUNCTURE: CPT

## 2017-10-23 PROCEDURE — 80053 COMPREHEN METABOLIC PANEL: CPT | Performed by: INTERNAL MEDICINE

## 2017-10-23 PROCEDURE — 85025 COMPLETE CBC W/AUTO DIFF WBC: CPT

## 2017-10-23 PROCEDURE — 99215 OFFICE O/P EST HI 40 MIN: CPT | Performed by: INTERNAL MEDICINE

## 2017-10-23 PROCEDURE — 96376 TX/PRO/DX INJ SAME DRUG ADON: CPT

## 2017-10-23 PROCEDURE — 96366 THER/PROPH/DIAG IV INF ADDON: CPT

## 2017-10-23 PROCEDURE — 25010000002 ETOPOSIDE 100 MG/5ML SOLUTION 50 ML VIAL: Performed by: INTERNAL MEDICINE

## 2017-10-23 PROCEDURE — 96413 CHEMO IV INFUSION 1 HR: CPT

## 2017-10-23 RX ORDER — IBUPROFEN 800 MG/1
TABLET ORAL
COMMUNITY
Start: 2017-10-10

## 2017-10-23 RX ORDER — PALONOSETRON 0.05 MG/ML
0.25 INJECTION, SOLUTION INTRAVENOUS ONCE
Status: COMPLETED | OUTPATIENT
Start: 2017-10-23 | End: 2017-10-23

## 2017-10-23 RX ORDER — SODIUM CHLORIDE 9 MG/ML
250 INJECTION, SOLUTION INTRAVENOUS ONCE
Status: COMPLETED | OUTPATIENT
Start: 2017-10-23 | End: 2017-10-23

## 2017-10-23 RX ORDER — OMEPRAZOLE 40 MG/1
CAPSULE, DELAYED RELEASE ORAL
COMMUNITY
Start: 2017-10-12

## 2017-10-23 RX ADMIN — ETOPOSIDE 170 MG: 20 INJECTION, SOLUTION, CONCENTRATE INTRAVENOUS at 12:17

## 2017-10-23 RX ADMIN — SODIUM CHLORIDE 150 MG: 9 INJECTION, SOLUTION INTRAVENOUS at 11:39

## 2017-10-23 RX ADMIN — DEXAMETHASONE SODIUM PHOSPHATE 12 MG: 4 INJECTION, SOLUTION INTRAMUSCULAR; INTRAVENOUS at 11:39

## 2017-10-23 RX ADMIN — CARBOPLATIN 440 MG: 10 INJECTION, SOLUTION INTRAVENOUS at 13:25

## 2017-10-23 RX ADMIN — SODIUM CHLORIDE 250 ML: 9 INJECTION, SOLUTION INTRAVENOUS at 11:35

## 2017-10-23 RX ADMIN — PALONOSETRON HYDROCHLORIDE 0.25 MG: 0.25 INJECTION INTRAVENOUS at 11:35

## 2017-10-23 NOTE — PROGRESS NOTES
DATE OF VISIT: 10/23/2017    REASON FOR VISIT: Followup for limited stage small cell lung cancer     HISTORY OF PRESENT ILLNESS: The patient is a very pleasant 77 y.o. female  with past medical history significant for limited stage small cell lung cancer diagnosed August 19, 2017.  The patient status post wedge resection of left upper lobe mass done by Dr. Thornton, final pathology revealed 1.7 cm small cell lung cancer with positive surgical margin and one negative level IVR lymph node.  The patient was started on adjuvant chemotherapy using carboplatin and etoposide September 25, 2017. The patient is here today for cycle #2, day 1.    SUBJECTIVE: The patient has been doing fairly well. she is able to tolerate  her treatment without any serious side effects. she denied any fever or  chills, no night sweats, denied any headaches    PAST MEDICAL HISTORY/SOCIAL HISTORY/FAMILY HISTORY: Unchanged from my prior documentation done on September 18, 2017    Review of Systems   Constitutional: Negative for activity change, appetite change, chills, fatigue, fever and unexpected weight change.   HENT: Negative for hearing loss, mouth sores, nosebleeds, sore throat and trouble swallowing.    Eyes: Negative for visual disturbance.   Respiratory: Negative for cough, chest tightness, shortness of breath and wheezing.    Cardiovascular: Negative for chest pain, palpitations and leg swelling.   Gastrointestinal: Negative for abdominal distention, abdominal pain, blood in stool, constipation, diarrhea, nausea, rectal pain and vomiting.   Endocrine: Negative for cold intolerance and heat intolerance.   Genitourinary: Negative for difficulty urinating, dysuria, frequency and urgency.   Musculoskeletal: Negative for arthralgias, back pain, gait problem, joint swelling and myalgias.   Skin: Negative for rash.   Neurological: Negative for dizziness, tremors, syncope, weakness, light-headedness, numbness and headaches.   Hematological:  "Negative for adenopathy. Does not bruise/bleed easily.   Psychiatric/Behavioral: Negative for confusion, sleep disturbance and suicidal ideas. The patient is not nervous/anxious.          Current Outpatient Prescriptions:   •  Lansoprazole (PREVACID PO), Take  by mouth., Disp: , Rfl:   •  aspirin 81 MG EC tablet, Take 81 mg by mouth Daily., Disp: , Rfl:   •  bisoprolol (ZEBeta) 5 MG tablet, Take 5 mg by mouth Daily., Disp: , Rfl:   •  cetirizine (zyrTEC) 10 MG tablet, Take 10 mg by mouth Daily., Disp: , Rfl:   •  clopidogrel (PLAVIX) 75 MG tablet, Take 75 mg by mouth Daily., Disp: , Rfl:   •  diphenoxylate-atropine (LOMOTIL) 2.5-0.025 MG per tablet, Take 1 tablet by mouth Every 6 (Six) Hours As Needed for Diarrhea. 1 tablet, Disp: 30 tablet, Rfl: 5  •  fesoterodine fumarate (TOVIAZ ER) 8 MG tablet sustained-release 24 hour capsule, Take  by mouth Daily., Disp: , Rfl:   •  gabapentin (NEURONTIN) 100 MG capsule, Take 100 mg by mouth 3 (Three) Times a Day., Disp: , Rfl:   •  hydrochlorothiazide (MICROZIDE) 12.5 MG capsule, Take 12.5 mg by mouth Daily., Disp: , Rfl:   •  ibuprofen (ADVIL,MOTRIN) 800 MG tablet, , Disp: , Rfl:   •  lisinopril (PRINIVIL,ZESTRIL) 40 MG tablet, Take 40 mg by mouth Daily., Disp: , Rfl:   •  omeprazole (priLOSEC) 40 MG capsule, , Disp: , Rfl:   •  ondansetron (ZOFRAN) 8 MG tablet, Take 1 tablet by mouth 3 (Three) Times a Day As Needed for Nausea or Vomiting., Disp: 30 tablet, Rfl: 5  •  simvastatin (ZOCOR) 20 MG tablet, Take 20 mg by mouth Every Night., Disp: , Rfl:   •  SITagliptin (JANUVIA) 100 MG tablet, Take 100 mg by mouth Daily., Disp: , Rfl:     PHYSICAL EXAMINATION:   /70 Comment: RUE  Pulse 75  Temp 97.8 °F (36.6 °C) (Temporal Artery )   Resp 18  Ht 62\" (157.5 cm)  Wt 148 lb (67.1 kg)  BMI 27.07 kg/m2   ECOG Performance Status: 0 - Asymptomatic  General Appearance:  alert, cooperative, no apparent distress and appears stated age   Neurologic/Psychiatric: A&O x 3, gait " steady, appropriate affect, strength 5/5 in all muscle groups   HEENT:  Normocephalic, without obvious abnormality, mucous membranes moist   Neck: Supple, symmetrical, trachea midline, no adenopathy;  No thyromegaly, masses, or tenderness   Lungs:   Clear to auscultation bilaterally; respirations regular, even, and unlabored bilaterally   Heart:  Regular rate and rhythm, no murmurs appreciated   Abdomen:   Soft, non-tender, non-distended and no organomegaly   Lymph nodes: No cervical, supraclavicular, inguinal or axillary adenopathy noted   Extremities: Normal, atraumatic; no clubbing, cyanosis, or edema    Skin: No rashes, ulcers, or suspicious lesions noted     Lab on 10/23/2017   Component Date Value Ref Range Status   • WBC 10/23/2017 8.20  3.50 - 10.80 10*3/mm3 Final   • RBC 10/23/2017 3.33* 3.89 - 5.14 10*6/mm3 Final   • Hemoglobin 10/23/2017 9.7* 11.5 - 15.5 g/dL Final   • Hematocrit 10/23/2017 30.9* 34.5 - 44.0 % Final   • RDW 10/23/2017 16.2* 11.3 - 14.5 % Final   • MCV 10/23/2017 92.9  80.0 - 99.0 fL Final   • MCH 10/23/2017 29.2  27.0 - 31.0 pg Final   • MCHC 10/23/2017 31.4* 32.0 - 36.0 g/dL Final   • MPV 10/23/2017 6.7  6.0 - 12.0 fL Final   • Platelets 10/23/2017 523* 150 - 450 10*3/mm3 Final   • Neutrophil % 10/23/2017 80.4* 41.0 - 71.0 % Final   • Lymphocyte % 10/23/2017 16.6* 24.0 - 44.0 % Final   • Monocyte % 10/23/2017 3.0  0.0 - 12.0 % Final   • Neutrophils, Absolute 10/23/2017 6.60  1.50 - 8.30 10*3/mm3 Final   • Lymphocytes, Absolute 10/23/2017 1.40  0.60 - 4.80 10*3/mm3 Final   • Monocytes, Absolute 10/23/2017 0.20  0.00 - 1.00 10*3/mm3 Final        Mri Brain With & Without Contrast    Result Date: 9/25/2017  Narrative: EXAMINATION: MRI BRAIN WITH AND WITHOUT CONTRAST-09/25/2017:  INDICATION: Complete lung cancer staging; C34.90-Malignant neoplasm of unspecified part of unspecified bronchus or lung.  TECHNIQUE: Multiplanar MRI of the brain with and without intravenous contrast administration.   COMPARISON: NONE.  FINDINGS: No restriction on diffusion-weighted sequences. Small area of encephalomalacia or congenital variant of the left lateral ventricle adjacent to the caudate head. Midline structures are otherwise symmetric without evidence of mass, mass effect or midline shift. Minimal chronic small vessel ischemic disease within the supratentorial white matter. By T2/FLAIR hyperintense signal. Pituitary and sella within normal limits. Cervicomedullary junction widely patent. Thornwaldt cyst present.  Postcontrast administration sequences are without abnormal enhancing lesion.      Impression: 1. No evidence for intracranial metastases. 2. 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  D:  09/25/2017 E:  09/25/2017     This report was finalized on 9/25/2017 12:46 PM by Dr. Devan Montze.        ASSESSMENT: The patient is a very pleasant 77 y.o. female  with Limited stage small cell lung cancer    PROBLEM LIST:  1.Stage I a small cell lung cancer S1yV1T2:  A.  Presented with shortening of breath and dry cough  B.  Whole body PET scan done July 17, 2017 revealed 2 cm hypermetabolic left upper lobe nodule  C.  Status post wedge resection done by Dr. Thornton August 16, 2017 pathology revealed 1.7 cm small cell carcinoma with focally positive margin and one negative level IVR lymph node.  D. Started adjuvant chemotherapy with carboplatin and etoposide September 25, 2017, status post 1 cycle  2.  Type 2 diabetes  3.  Hypertension  4.  Chronic diarrhea     PLAN:  1.  I will proceed with chemotherapy as scheduled today.  2.  The patient will follow up with me in 4 weeks for cycle #3 out of 4 planned.  3.  I'll monitor patient blood work including blood counts kidney failure function.  4. We reviewed the potential side effects of this regimen including fatigue, vomiting and nausea, hair loss, nephropathy, neuropathy, hearing loss,  myelosuppression, risk of infusion reaction, and potential death.  5.  I will start patient on Zofran as needed for chemotherapy-induced nausea.  6.  I'll repeat her scans after completion of chemotherapy to consider adjuvant radiation at that point.  Patient is not a candidate for concurrent treatment modality given her age as well as borderline performance status.  7.  I'll consider restarting patient on Lasix if her lower extremity edema to worsen.  8.  I did go over the MRI results with the patient and her family, reviewed the films myself, there is no evidence of metastatic disease.  I'll follow-up on the official read by the radiologist.  9.  We'll consider inserting Port-A-Cath if needed.  10.  We'll continue Imodium as well as Lomotil as needed for diarrhea.    Damaris Martin MD  10/23/2017

## 2017-10-24 ENCOUNTER — INFUSION (OUTPATIENT)
Dept: ONCOLOGY | Facility: HOSPITAL | Age: 77
End: 2017-10-24

## 2017-10-24 VITALS
HEART RATE: 61 BPM | BODY MASS INDEX: 27.23 KG/M2 | RESPIRATION RATE: 18 BRPM | TEMPERATURE: 97.7 F | WEIGHT: 148 LBS | DIASTOLIC BLOOD PRESSURE: 73 MMHG | HEIGHT: 62 IN | SYSTOLIC BLOOD PRESSURE: 172 MMHG

## 2017-10-24 DIAGNOSIS — C34.90 SCLC (SMALL CELL LUNG CARCINOMA), UNSPECIFIED LATERALITY (HCC): Primary | ICD-10-CM

## 2017-10-24 PROCEDURE — 25010000002 ETOPOSIDE 100 MG/5ML SOLUTION 50 ML VIAL: Performed by: INTERNAL MEDICINE

## 2017-10-24 PROCEDURE — 96375 TX/PRO/DX INJ NEW DRUG ADDON: CPT

## 2017-10-24 PROCEDURE — 96413 CHEMO IV INFUSION 1 HR: CPT

## 2017-10-24 PROCEDURE — 25010000002 DEXAMETHASONE PER 1 MG: Performed by: INTERNAL MEDICINE

## 2017-10-24 RX ADMIN — DEXAMETHASONE SODIUM PHOSPHATE 12 MG: 4 INJECTION, SOLUTION INTRAMUSCULAR; INTRAVENOUS at 11:10

## 2017-10-24 RX ADMIN — ETOPOSIDE 170 MG: 20 INJECTION, SOLUTION, CONCENTRATE INTRAVENOUS at 11:29

## 2017-10-25 ENCOUNTER — INFUSION (OUTPATIENT)
Dept: ONCOLOGY | Facility: HOSPITAL | Age: 77
End: 2017-10-25

## 2017-10-25 VITALS
DIASTOLIC BLOOD PRESSURE: 77 MMHG | SYSTOLIC BLOOD PRESSURE: 189 MMHG | BODY MASS INDEX: 27.23 KG/M2 | TEMPERATURE: 97.5 F | WEIGHT: 148 LBS | HEIGHT: 62 IN | RESPIRATION RATE: 18 BRPM | HEART RATE: 59 BPM

## 2017-10-25 DIAGNOSIS — C34.90 SCLC (SMALL CELL LUNG CARCINOMA), UNSPECIFIED LATERALITY (HCC): Primary | ICD-10-CM

## 2017-10-25 PROCEDURE — 25010000002 DEXAMETHASONE PER 1 MG: Performed by: INTERNAL MEDICINE

## 2017-10-25 PROCEDURE — 96415 CHEMO IV INFUSION ADDL HR: CPT

## 2017-10-25 PROCEDURE — 25010000002 PEGFILGRASTIM 6 MG/0.6ML PREFILLED SYRINGE KIT: Performed by: INTERNAL MEDICINE

## 2017-10-25 PROCEDURE — 96413 CHEMO IV INFUSION 1 HR: CPT

## 2017-10-25 PROCEDURE — 96377 APPLICATON ON-BODY INJECTOR: CPT

## 2017-10-25 PROCEDURE — 96375 TX/PRO/DX INJ NEW DRUG ADDON: CPT

## 2017-10-25 PROCEDURE — 25010000002 ETOPOSIDE 100 MG/5ML SOLUTION 50 ML VIAL: Performed by: INTERNAL MEDICINE

## 2017-10-25 RX ADMIN — ETOPOSIDE 170 MG: 20 INJECTION, SOLUTION, CONCENTRATE INTRAVENOUS at 12:06

## 2017-10-25 RX ADMIN — DEXAMETHASONE SODIUM PHOSPHATE 12 MG: 4 INJECTION, SOLUTION INTRAMUSCULAR; INTRAVENOUS at 11:48

## 2017-10-25 RX ADMIN — PEGFILGRASTIM 6 MG: KIT SUBCUTANEOUS at 13:12

## 2017-11-20 ENCOUNTER — LAB (OUTPATIENT)
Dept: LAB | Facility: HOSPITAL | Age: 77
End: 2017-11-20

## 2017-11-20 ENCOUNTER — OFFICE VISIT (OUTPATIENT)
Dept: ONCOLOGY | Facility: CLINIC | Age: 77
End: 2017-11-20

## 2017-11-20 ENCOUNTER — INFUSION (OUTPATIENT)
Dept: ONCOLOGY | Facility: HOSPITAL | Age: 77
End: 2017-11-20

## 2017-11-20 VITALS
BODY MASS INDEX: 26.68 KG/M2 | HEART RATE: 67 BPM | WEIGHT: 145 LBS | SYSTOLIC BLOOD PRESSURE: 198 MMHG | TEMPERATURE: 97.3 F | DIASTOLIC BLOOD PRESSURE: 74 MMHG | RESPIRATION RATE: 16 BRPM | HEIGHT: 62 IN

## 2017-11-20 DIAGNOSIS — C34.90 SCLC (SMALL CELL LUNG CARCINOMA), UNSPECIFIED LATERALITY (HCC): ICD-10-CM

## 2017-11-20 DIAGNOSIS — C34.90 SMALL CELL CARCINOMA OF LUNG, UNSPECIFIED LATERALITY: Primary | ICD-10-CM

## 2017-11-20 DIAGNOSIS — C34.90 SCLC (SMALL CELL LUNG CARCINOMA), UNSPECIFIED LATERALITY (HCC): Primary | ICD-10-CM

## 2017-11-20 LAB
ALBUMIN SERPL-MCNC: 3.9 G/DL (ref 3.2–4.8)
ALBUMIN/GLOB SERPL: 1.6 G/DL (ref 1.5–2.5)
ALP SERPL-CCNC: 75 U/L (ref 25–100)
ALT SERPL W P-5'-P-CCNC: 11 U/L (ref 7–40)
ANION GAP SERPL CALCULATED.3IONS-SCNC: 10 MMOL/L (ref 3–11)
AST SERPL-CCNC: 15 U/L (ref 0–33)
BILIRUB SERPL-MCNC: 0.4 MG/DL (ref 0.3–1.2)
BUN BLD-MCNC: 13 MG/DL (ref 9–23)
BUN/CREAT SERPL: 14.4 (ref 7–25)
CALCIUM SPEC-SCNC: 9.4 MG/DL (ref 8.7–10.4)
CHLORIDE SERPL-SCNC: 101 MMOL/L (ref 99–109)
CO2 SERPL-SCNC: 32 MMOL/L (ref 20–31)
CREAT BLD-MCNC: 0.9 MG/DL (ref 0.6–1.3)
CREAT BLDA-MCNC: 1 MG/DL (ref 0.6–1.3)
ERYTHROCYTE [DISTWIDTH] IN BLOOD BY AUTOMATED COUNT: 21.3 % (ref 11.3–14.5)
GFR SERPL CREATININE-BSD FRML MDRD: 61 ML/MIN/1.73
GLOBULIN UR ELPH-MCNC: 2.4 GM/DL
GLUCOSE BLD-MCNC: 157 MG/DL (ref 70–100)
HCT VFR BLD AUTO: 29.1 % (ref 34.5–44)
HGB BLD-MCNC: 9 G/DL (ref 11.5–15.5)
LYMPHOCYTES # BLD AUTO: 1.4 10*3/MM3 (ref 0.6–4.8)
LYMPHOCYTES NFR BLD AUTO: 17 % (ref 24–44)
MCH RBC QN AUTO: 29.9 PG (ref 27–31)
MCHC RBC AUTO-ENTMCNC: 31 G/DL (ref 32–36)
MCV RBC AUTO: 96.5 FL (ref 80–99)
MONOCYTES # BLD AUTO: 0.4 10*3/MM3 (ref 0–1)
MONOCYTES NFR BLD AUTO: 5.4 % (ref 0–12)
NEUTROPHILS # BLD AUTO: 6.2 10*3/MM3 (ref 1.5–8.3)
NEUTROPHILS NFR BLD AUTO: 77.6 % (ref 41–71)
PLATELET # BLD AUTO: 317 10*3/MM3 (ref 150–450)
PMV BLD AUTO: 6.2 FL (ref 6–12)
POTASSIUM BLD-SCNC: 4.2 MMOL/L (ref 3.5–5.5)
PROT SERPL-MCNC: 6.3 G/DL (ref 5.7–8.2)
RBC # BLD AUTO: 3.01 10*6/MM3 (ref 3.89–5.14)
SODIUM BLD-SCNC: 143 MMOL/L (ref 132–146)
WBC NRBC COR # BLD: 8 10*3/MM3 (ref 3.5–10.8)

## 2017-11-20 PROCEDURE — 96367 TX/PROPH/DG ADDL SEQ IV INF: CPT

## 2017-11-20 PROCEDURE — 96366 THER/PROPH/DIAG IV INF ADDON: CPT

## 2017-11-20 PROCEDURE — 82565 ASSAY OF CREATININE: CPT

## 2017-11-20 PROCEDURE — 25010000002 FOSAPREPITANT PER 1 MG: Performed by: NURSE PRACTITIONER

## 2017-11-20 PROCEDURE — 96413 CHEMO IV INFUSION 1 HR: CPT

## 2017-11-20 PROCEDURE — 99215 OFFICE O/P EST HI 40 MIN: CPT | Performed by: INTERNAL MEDICINE

## 2017-11-20 PROCEDURE — 96417 CHEMO IV INFUS EACH ADDL SEQ: CPT

## 2017-11-20 PROCEDURE — 96375 TX/PRO/DX INJ NEW DRUG ADDON: CPT

## 2017-11-20 PROCEDURE — 25010000002 CARBOPLATIN PER 50 MG: Performed by: NURSE PRACTITIONER

## 2017-11-20 PROCEDURE — 25010000002 ETOPOSIDE 100 MG/5ML SOLUTION 25 ML VIAL: Performed by: NURSE PRACTITIONER

## 2017-11-20 PROCEDURE — 25010000002 DEXAMETHASONE PER 1 MG: Performed by: NURSE PRACTITIONER

## 2017-11-20 PROCEDURE — 96415 CHEMO IV INFUSION ADDL HR: CPT

## 2017-11-20 PROCEDURE — 25010000002 PALONOSETRON PER 25 MCG: Performed by: NURSE PRACTITIONER

## 2017-11-20 RX ORDER — PALONOSETRON 0.05 MG/ML
0.25 INJECTION, SOLUTION INTRAVENOUS ONCE
Status: CANCELLED | OUTPATIENT
Start: 2017-11-20

## 2017-11-20 RX ORDER — SODIUM CHLORIDE 9 MG/ML
250 INJECTION, SOLUTION INTRAVENOUS ONCE
Status: DISCONTINUED | OUTPATIENT
Start: 2017-11-20 | End: 2017-11-20 | Stop reason: HOSPADM

## 2017-11-20 RX ORDER — SODIUM CHLORIDE 9 MG/ML
250 INJECTION, SOLUTION INTRAVENOUS ONCE
Status: CANCELLED | OUTPATIENT
Start: 2017-11-20

## 2017-11-20 RX ORDER — PALONOSETRON 0.05 MG/ML
0.25 INJECTION, SOLUTION INTRAVENOUS ONCE
Status: COMPLETED | OUTPATIENT
Start: 2017-11-20 | End: 2017-11-20

## 2017-11-20 RX ADMIN — DEXAMETHASONE SODIUM PHOSPHATE 12 MG: 4 INJECTION, SOLUTION INTRAMUSCULAR; INTRAVENOUS at 12:27

## 2017-11-20 RX ADMIN — ETOPOSIDE 170 MG: 20 INJECTION, SOLUTION INTRAVENOUS at 12:50

## 2017-11-20 RX ADMIN — SODIUM CHLORIDE 150 MG: 9 INJECTION, SOLUTION INTRAVENOUS at 12:27

## 2017-11-20 RX ADMIN — PALONOSETRON HYDROCHLORIDE 0.25 MG: 0.25 INJECTION INTRAVENOUS at 12:23

## 2017-11-20 RX ADMIN — CARBOPLATIN 370 MG: 10 INJECTION, SOLUTION INTRAVENOUS at 14:09

## 2017-11-20 NOTE — PROGRESS NOTES
DATE OF VISIT: 11/20/2017    REASON FOR VISIT: Followup for limited stage small cell lung cancer     HISTORY OF PRESENT ILLNESS: The patient is a very pleasant 77 y.o. female  with past medical history significant for limited stage small cell lung cancer diagnosed August 19, 2017.  The patient status post wedge resection of left upper lobe mass done by Dr. Thornton, final pathology revealed 1.7 cm small cell lung cancer with positive surgical margin and one negative level IVR lymph node.  The patient was started on adjuvant chemotherapy using carboplatin and etoposide September 25, 2017. The patient is here today for cycle #3, day 1.    SUBJECTIVE: The patient has been doing fairly well. she is able to tolerate  her treatment without any serious side effects. she denied any fever or  chills, no night sweats, denied any headaches.  She is complaining of progressive fatigue.  His been having some nausea but no vomiting.  Her son feels she's been having some mental status changes.    PAST MEDICAL HISTORY/SOCIAL HISTORY/FAMILY HISTORY: Unchanged from my prior documentation done on September 18, 2017    Review of Systems   Constitutional: Negative for activity change, appetite change, chills, fatigue, fever and unexpected weight change.   HENT: Negative for hearing loss, mouth sores, nosebleeds, sore throat and trouble swallowing.    Eyes: Negative for visual disturbance.   Respiratory: Negative for cough, chest tightness, shortness of breath and wheezing.    Cardiovascular: Negative for chest pain, palpitations and leg swelling.   Gastrointestinal: Negative for abdominal distention, abdominal pain, blood in stool, constipation, diarrhea, nausea, rectal pain and vomiting.   Endocrine: Negative for cold intolerance and heat intolerance.   Genitourinary: Negative for difficulty urinating, dysuria, frequency and urgency.   Musculoskeletal: Negative for arthralgias, back pain, gait problem, joint swelling and myalgias.   Skin:  Negative for rash.   Neurological: Negative for dizziness, tremors, syncope, weakness, light-headedness, numbness and headaches.   Hematological: Negative for adenopathy. Does not bruise/bleed easily.   Psychiatric/Behavioral: Negative for confusion, sleep disturbance and suicidal ideas. The patient is not nervous/anxious.          Current Outpatient Prescriptions:   •  aspirin 81 MG EC tablet, Take 81 mg by mouth Daily., Disp: , Rfl:   •  bisoprolol (ZEBeta) 5 MG tablet, Take 5 mg by mouth Daily., Disp: , Rfl:   •  cetirizine (zyrTEC) 10 MG tablet, Take 10 mg by mouth Daily., Disp: , Rfl:   •  clopidogrel (PLAVIX) 75 MG tablet, Take 75 mg by mouth Daily., Disp: , Rfl:   •  diphenoxylate-atropine (LOMOTIL) 2.5-0.025 MG per tablet, Take 1 tablet by mouth Every 6 (Six) Hours As Needed for Diarrhea. 1 tablet, Disp: 30 tablet, Rfl: 5  •  fesoterodine fumarate (TOVIAZ ER) 8 MG tablet sustained-release 24 hour capsule, Take  by mouth Daily., Disp: , Rfl:   •  gabapentin (NEURONTIN) 100 MG capsule, Take 100 mg by mouth 3 (Three) Times a Day., Disp: , Rfl:   •  hydrochlorothiazide (MICROZIDE) 12.5 MG capsule, Take 12.5 mg by mouth Daily., Disp: , Rfl:   •  ibuprofen (ADVIL,MOTRIN) 800 MG tablet, , Disp: , Rfl:   •  Lansoprazole (PREVACID PO), Take  by mouth., Disp: , Rfl:   •  lisinopril (PRINIVIL,ZESTRIL) 40 MG tablet, Take 40 mg by mouth Daily., Disp: , Rfl:   •  omeprazole (priLOSEC) 40 MG capsule, , Disp: , Rfl:   •  ondansetron (ZOFRAN) 8 MG tablet, Take 1 tablet by mouth 3 (Three) Times a Day As Needed for Nausea or Vomiting., Disp: 30 tablet, Rfl: 5  •  simvastatin (ZOCOR) 20 MG tablet, Take 20 mg by mouth Every Night., Disp: , Rfl:   •  SITagliptin (JANUVIA) 100 MG tablet, Take 100 mg by mouth Daily., Disp: , Rfl:     PHYSICAL EXAMINATION:   There were no vitals taken for this visit.   ECOG Performance Status: 0 - Asymptomatic  General Appearance:  alert, cooperative, no apparent distress and appears stated age    Neurologic/Psychiatric: A&O x 3, gait steady, appropriate affect, strength 5/5 in all muscle groups   HEENT:  Normocephalic, without obvious abnormality, mucous membranes moist   Neck: Supple, symmetrical, trachea midline, no adenopathy;  No thyromegaly, masses, or tenderness   Lungs:   Clear to auscultation bilaterally; respirations regular, even, and unlabored bilaterally   Heart:  Regular rate and rhythm, no murmurs appreciated   Abdomen:   Soft, non-tender, non-distended and no organomegaly   Lymph nodes: No cervical, supraclavicular, inguinal or axillary adenopathy noted   Extremities: Normal, atraumatic; no clubbing, cyanosis, or edema    Skin: No rashes, ulcers, or suspicious lesions noted     No visits with results within 2 Week(s) from this visit.  Latest known visit with results is:    Lab on 10/23/2017   Component Date Value Ref Range Status   • Glucose 10/23/2017 175* 70 - 100 mg/dL Final   • BUN 10/23/2017 8* 9 - 23 mg/dL Final   • Creatinine 10/23/2017 0.80  0.60 - 1.30 mg/dL Final   • Sodium 10/23/2017 137  132 - 146 mmol/L Final   • Potassium 10/23/2017 3.8  3.5 - 5.5 mmol/L Final   • Chloride 10/23/2017 102  99 - 109 mmol/L Final   • CO2 10/23/2017 31.0  20.0 - 31.0 mmol/L Final   • Calcium 10/23/2017 8.9  8.7 - 10.4 mg/dL Final   • Total Protein 10/23/2017 6.2  5.7 - 8.2 g/dL Final   • Albumin 10/23/2017 3.60  3.20 - 4.80 g/dL Final   • ALT (SGPT) 10/23/2017 7  7 - 40 U/L Final   • AST (SGOT) 10/23/2017 12  0 - 33 U/L Final   • Alkaline Phosphatase 10/23/2017 69  25 - 100 U/L Final   • Total Bilirubin 10/23/2017 0.3  0.3 - 1.2 mg/dL Final   • eGFR Non  Amer 10/23/2017 70  >60 mL/min/1.73 Final   • Globulin 10/23/2017 2.6  gm/dL Final   • A/G Ratio 10/23/2017 1.4* 1.5 - 2.5 g/dL Final   • BUN/Creatinine Ratio 10/23/2017 10.0  7.0 - 25.0 Final   • Anion Gap 10/23/2017 4.0  3.0 - 11.0 mmol/L Final   • WBC 10/23/2017 8.20  3.50 - 10.80 10*3/mm3 Final   • RBC 10/23/2017 3.33* 3.89 - 5.14  10*6/mm3 Final   • Hemoglobin 10/23/2017 9.7* 11.5 - 15.5 g/dL Final   • Hematocrit 10/23/2017 30.9* 34.5 - 44.0 % Final   • RDW 10/23/2017 16.2* 11.3 - 14.5 % Final   • MCV 10/23/2017 92.9  80.0 - 99.0 fL Final   • MCH 10/23/2017 29.2  27.0 - 31.0 pg Final   • MCHC 10/23/2017 31.4* 32.0 - 36.0 g/dL Final   • MPV 10/23/2017 6.7  6.0 - 12.0 fL Final   • Platelets 10/23/2017 523* 150 - 450 10*3/mm3 Final   • Neutrophil % 10/23/2017 80.4* 41.0 - 71.0 % Final   • Lymphocyte % 10/23/2017 16.6* 24.0 - 44.0 % Final   • Monocyte % 10/23/2017 3.0  0.0 - 12.0 % Final   • Neutrophils, Absolute 10/23/2017 6.60  1.50 - 8.30 10*3/mm3 Final   • Lymphocytes, Absolute 10/23/2017 1.40  0.60 - 4.80 10*3/mm3 Final   • Monocytes, Absolute 10/23/2017 0.20  0.00 - 1.00 10*3/mm3 Final        No results found.    ASSESSMENT: The patient is a very pleasant 77 y.o. female  with limited stage small cell lung cancer    PROBLEM LIST:  1.Stage I a small cell lung cancer [id number]:  A.  Presented with shortening of breath and dry cough  B.  Whole body PET scan done July 17, 2017 revealed 2 cm hypermetabolic left upper lobe nodule  C.  Status post wedge resection done by Dr. Thornton August 16, 2017 pathology revealed 1.7 cm small cell carcinoma with focally positive margin and one negative level IVR lymph node.  D. Started adjuvant chemotherapy with carboplatin and etoposide September 25, 2017, status post 2 cycle  2.  Type 2 diabetes  3.  Hypertension  4.  Chronic diarrhea   5.  Chemotherapy induced anemia    PLAN:  1.  I will proceed with chemotherapy as scheduled today.  2.  The patient will follow up with me in 4 weeks for cycle #4 out of 4 planned.  3.  I'll monitor patient blood work including blood counts kidney failure function.  4. We reviewed the potential side effects of this regimen including fatigue, vomiting and nausea, hair loss, nephropathy, neuropathy, hearing loss, myelosuppression, risk of infusion reaction, and potential death.  5.   I will start patient on Zofran as needed for chemotherapy-induced nausea.  6.  I'll repeat her scans after completion of chemotherapy to consider adjuvant radiation at that point.  Patient is not a candidate for concurrent treatment modality given her age as well as borderline performance status.  7.  I'll consider restarting patient on Lasix if her lower extremity edema to worsen.  8.  I did go over the MRI results with the patient and her family, reviewed the films myself, there is no evidence of metastatic disease.  I'll follow-up on the official read by the radiologist.  9.  We'll consider inserting Port-A-Cath if needed.  10.  We'll continue Imodium as well as Lomotil as needed for diarrhea.  11.  We'll transfuse 2 units of blood for hemoglobin less than 8.    Scribed for Damaris Martin MD by BRENDA Bonilla. 11/20/2017  9:11 AM  Damaris Martin MD  11/20/2017   I, Damaris Martin MD, personally performed the services described in this documentation as scribed by the above named individual in my presence, and it is both accurate and complete.  11/20/2017  11:42 AM

## 2017-11-21 ENCOUNTER — INFUSION (OUTPATIENT)
Dept: ONCOLOGY | Facility: HOSPITAL | Age: 77
End: 2017-11-21

## 2017-11-21 VITALS
HEIGHT: 62 IN | SYSTOLIC BLOOD PRESSURE: 160 MMHG | BODY MASS INDEX: 26.87 KG/M2 | DIASTOLIC BLOOD PRESSURE: 71 MMHG | WEIGHT: 146 LBS | HEART RATE: 85 BPM | TEMPERATURE: 97.8 F | RESPIRATION RATE: 16 BRPM

## 2017-11-21 DIAGNOSIS — C34.90 SCLC (SMALL CELL LUNG CARCINOMA), UNSPECIFIED LATERALITY (HCC): Primary | ICD-10-CM

## 2017-11-21 DIAGNOSIS — C34.90 SCLC (SMALL CELL LUNG CARCINOMA), UNSPECIFIED LATERALITY (HCC): ICD-10-CM

## 2017-11-21 PROCEDURE — 25010000002 DEXAMETHASONE PER 1 MG: Performed by: INTERNAL MEDICINE

## 2017-11-21 PROCEDURE — 25010000002 ETOPOSIDE 100 MG/5ML SOLUTION 25 ML VIAL: Performed by: INTERNAL MEDICINE

## 2017-11-21 PROCEDURE — 96375 TX/PRO/DX INJ NEW DRUG ADDON: CPT

## 2017-11-21 PROCEDURE — 96413 CHEMO IV INFUSION 1 HR: CPT

## 2017-11-21 RX ORDER — SODIUM CHLORIDE 9 MG/ML
250 INJECTION, SOLUTION INTRAVENOUS ONCE
Status: CANCELLED | OUTPATIENT
Start: 2017-11-21

## 2017-11-21 RX ORDER — SODIUM CHLORIDE 9 MG/ML
250 INJECTION, SOLUTION INTRAVENOUS ONCE
Status: CANCELLED | OUTPATIENT
Start: 2017-12-18

## 2017-11-21 RX ORDER — SODIUM CHLORIDE 9 MG/ML
250 INJECTION, SOLUTION INTRAVENOUS ONCE
Status: CANCELLED | OUTPATIENT
Start: 2017-12-19

## 2017-11-21 RX ORDER — SODIUM CHLORIDE 9 MG/ML
250 INJECTION, SOLUTION INTRAVENOUS ONCE
Status: CANCELLED | OUTPATIENT
Start: 2017-11-22

## 2017-11-21 RX ORDER — PALONOSETRON 0.05 MG/ML
0.25 INJECTION, SOLUTION INTRAVENOUS ONCE
Status: CANCELLED | OUTPATIENT
Start: 2017-12-18

## 2017-11-21 RX ORDER — SODIUM CHLORIDE 9 MG/ML
250 INJECTION, SOLUTION INTRAVENOUS ONCE
Status: CANCELLED | OUTPATIENT
Start: 2017-12-20

## 2017-11-21 RX ORDER — SODIUM CHLORIDE 9 MG/ML
250 INJECTION, SOLUTION INTRAVENOUS ONCE
Status: DISCONTINUED | OUTPATIENT
Start: 2017-11-21 | End: 2017-11-21 | Stop reason: HOSPADM

## 2017-11-21 RX ADMIN — ETOPOSIDE 170 MG: 20 INJECTION, SOLUTION INTRAVENOUS at 12:43

## 2017-11-21 RX ADMIN — DEXAMETHASONE SODIUM PHOSPHATE 12 MG: 4 INJECTION, SOLUTION INTRAMUSCULAR; INTRAVENOUS at 12:24

## 2017-11-22 ENCOUNTER — INFUSION (OUTPATIENT)
Dept: ONCOLOGY | Facility: HOSPITAL | Age: 77
End: 2017-11-22

## 2017-11-22 VITALS
RESPIRATION RATE: 16 BRPM | DIASTOLIC BLOOD PRESSURE: 64 MMHG | BODY MASS INDEX: 26.87 KG/M2 | HEIGHT: 62 IN | WEIGHT: 146 LBS | HEART RATE: 58 BPM | SYSTOLIC BLOOD PRESSURE: 166 MMHG | TEMPERATURE: 97.7 F

## 2017-11-22 DIAGNOSIS — C34.90 SCLC (SMALL CELL LUNG CARCINOMA), UNSPECIFIED LATERALITY (HCC): Primary | ICD-10-CM

## 2017-11-22 PROCEDURE — 25010000002 DEXAMETHASONE PER 1 MG: Performed by: INTERNAL MEDICINE

## 2017-11-22 PROCEDURE — 25010000002 PEGFILGRASTIM 6 MG/0.6ML PREFILLED SYRINGE KIT: Performed by: INTERNAL MEDICINE

## 2017-11-22 PROCEDURE — 96413 CHEMO IV INFUSION 1 HR: CPT

## 2017-11-22 PROCEDURE — 96377 APPLICATON ON-BODY INJECTOR: CPT

## 2017-11-22 PROCEDURE — 25010000002 ETOPOSIDE 100 MG/5ML SOLUTION 25 ML VIAL: Performed by: INTERNAL MEDICINE

## 2017-11-22 PROCEDURE — 96375 TX/PRO/DX INJ NEW DRUG ADDON: CPT

## 2017-11-22 RX ADMIN — DEXAMETHASONE SODIUM PHOSPHATE 12 MG: 4 INJECTION, SOLUTION INTRAMUSCULAR; INTRAVENOUS at 13:19

## 2017-11-22 RX ADMIN — ETOPOSIDE 170 MG: 20 INJECTION, SOLUTION INTRAVENOUS at 13:35

## 2017-11-22 RX ADMIN — PEGFILGRASTIM 6 MG: KIT SUBCUTANEOUS at 13:54

## 2017-11-29 ENCOUNTER — TELEPHONE (OUTPATIENT)
Dept: ONCOLOGY | Facility: CLINIC | Age: 77
End: 2017-11-29

## 2017-11-29 RX ORDER — SULFAMETHOXAZOLE AND TRIMETHOPRIM 800; 160 MG/1; MG/1
1 TABLET ORAL 2 TIMES DAILY
Qty: 20 TABLET | Refills: 0 | Status: SHIPPED | OUTPATIENT
Start: 2017-11-29 | End: 2017-12-20

## 2017-11-29 NOTE — TELEPHONE ENCOUNTER
----- Message from Arabella Loza sent at 11/29/2017  9:13 AM EST -----  Regarding: BADIN-BURNING DURING URINATION  Contact: [phone]  Jossie patient's daughter in law called and patient is experiencing burning with she urinates another number to call [phone].

## 2017-12-18 ENCOUNTER — OFFICE VISIT (OUTPATIENT)
Dept: ONCOLOGY | Facility: CLINIC | Age: 77
End: 2017-12-18

## 2017-12-18 ENCOUNTER — LAB (OUTPATIENT)
Dept: LAB | Facility: HOSPITAL | Age: 77
End: 2017-12-18

## 2017-12-18 ENCOUNTER — INFUSION (OUTPATIENT)
Dept: ONCOLOGY | Facility: HOSPITAL | Age: 77
End: 2017-12-18

## 2017-12-18 VITALS
TEMPERATURE: 97.5 F | HEART RATE: 68 BPM | BODY MASS INDEX: 26.5 KG/M2 | SYSTOLIC BLOOD PRESSURE: 149 MMHG | HEIGHT: 62 IN | DIASTOLIC BLOOD PRESSURE: 69 MMHG | WEIGHT: 144 LBS | RESPIRATION RATE: 16 BRPM

## 2017-12-18 DIAGNOSIS — C34.90 SMALL CELL CARCINOMA OF LUNG, UNSPECIFIED LATERALITY: Primary | ICD-10-CM

## 2017-12-18 DIAGNOSIS — C34.90 SCLC (SMALL CELL LUNG CARCINOMA), UNSPECIFIED LATERALITY (HCC): Primary | ICD-10-CM

## 2017-12-18 DIAGNOSIS — C34.90 SCLC (SMALL CELL LUNG CARCINOMA), UNSPECIFIED LATERALITY (HCC): ICD-10-CM

## 2017-12-18 LAB
ALBUMIN SERPL-MCNC: 4 G/DL (ref 3.2–4.8)
ALBUMIN/GLOB SERPL: 1.8 G/DL (ref 1.5–2.5)
ALP SERPL-CCNC: 68 U/L (ref 25–100)
ALT SERPL W P-5'-P-CCNC: 7 U/L (ref 7–40)
ANION GAP SERPL CALCULATED.3IONS-SCNC: 8 MMOL/L (ref 3–11)
AST SERPL-CCNC: 16 U/L (ref 0–33)
BILIRUB SERPL-MCNC: 0.3 MG/DL (ref 0.3–1.2)
BUN BLD-MCNC: 10 MG/DL (ref 9–23)
BUN/CREAT SERPL: 12.5 (ref 7–25)
CALCIUM SPEC-SCNC: 9.1 MG/DL (ref 8.7–10.4)
CHLORIDE SERPL-SCNC: 100 MMOL/L (ref 99–109)
CO2 SERPL-SCNC: 31 MMOL/L (ref 20–31)
CREAT BLD-MCNC: 0.8 MG/DL (ref 0.6–1.3)
ERYTHROCYTE [DISTWIDTH] IN BLOOD BY AUTOMATED COUNT: 22.6 % (ref 11.3–14.5)
GFR SERPL CREATININE-BSD FRML MDRD: 70 ML/MIN/1.73
GLOBULIN UR ELPH-MCNC: 2.2 GM/DL
GLUCOSE BLD-MCNC: 116 MG/DL (ref 70–100)
HCT VFR BLD AUTO: 25.7 % (ref 34.5–44)
HGB BLD-MCNC: 8.2 G/DL (ref 11.5–15.5)
LYMPHOCYTES # BLD AUTO: 1.4 10*3/MM3 (ref 0.6–4.8)
LYMPHOCYTES NFR BLD AUTO: 22.7 % (ref 24–44)
MCH RBC QN AUTO: 32.2 PG (ref 27–31)
MCHC RBC AUTO-ENTMCNC: 32 G/DL (ref 32–36)
MCV RBC AUTO: 100.6 FL (ref 80–99)
MONOCYTES # BLD AUTO: 0.3 10*3/MM3 (ref 0–1)
MONOCYTES NFR BLD AUTO: 5.4 % (ref 0–12)
NEUTROPHILS # BLD AUTO: 4.5 10*3/MM3 (ref 1.5–8.3)
NEUTROPHILS NFR BLD AUTO: 71.9 % (ref 41–71)
PLATELET # BLD AUTO: 229 10*3/MM3 (ref 150–450)
PMV BLD AUTO: 6.7 FL (ref 6–12)
POTASSIUM BLD-SCNC: 4.1 MMOL/L (ref 3.5–5.5)
PROT SERPL-MCNC: 6.2 G/DL (ref 5.7–8.2)
RBC # BLD AUTO: 2.56 10*6/MM3 (ref 3.89–5.14)
SODIUM BLD-SCNC: 139 MMOL/L (ref 132–146)
WBC NRBC COR # BLD: 6.3 10*3/MM3 (ref 3.5–10.8)

## 2017-12-18 PROCEDURE — 25010000002 ETOPOSIDE 100 MG/5ML SOLUTION 50 ML VIAL: Performed by: INTERNAL MEDICINE

## 2017-12-18 PROCEDURE — 25010000002 FOSAPREPITANT PER 1 MG: Performed by: INTERNAL MEDICINE

## 2017-12-18 PROCEDURE — 96375 TX/PRO/DX INJ NEW DRUG ADDON: CPT

## 2017-12-18 PROCEDURE — 96413 CHEMO IV INFUSION 1 HR: CPT

## 2017-12-18 PROCEDURE — 36415 COLL VENOUS BLD VENIPUNCTURE: CPT

## 2017-12-18 PROCEDURE — 25010000002 PALONOSETRON PER 25 MCG: Performed by: INTERNAL MEDICINE

## 2017-12-18 PROCEDURE — 25010000002 CARBOPLATIN PER 50 MG: Performed by: INTERNAL MEDICINE

## 2017-12-18 PROCEDURE — 99215 OFFICE O/P EST HI 40 MIN: CPT | Performed by: INTERNAL MEDICINE

## 2017-12-18 PROCEDURE — 80053 COMPREHEN METABOLIC PANEL: CPT

## 2017-12-18 PROCEDURE — 85025 COMPLETE CBC W/AUTO DIFF WBC: CPT

## 2017-12-18 PROCEDURE — 25010000002 DEXAMETHASONE PER 1 MG: Performed by: INTERNAL MEDICINE

## 2017-12-18 PROCEDURE — 96417 CHEMO IV INFUS EACH ADDL SEQ: CPT

## 2017-12-18 PROCEDURE — 96367 TX/PROPH/DG ADDL SEQ IV INF: CPT

## 2017-12-18 RX ORDER — SODIUM CHLORIDE 9 MG/ML
250 INJECTION, SOLUTION INTRAVENOUS ONCE
Status: COMPLETED | OUTPATIENT
Start: 2017-12-18 | End: 2017-12-18

## 2017-12-18 RX ORDER — PALONOSETRON 0.05 MG/ML
0.25 INJECTION, SOLUTION INTRAVENOUS ONCE
Status: COMPLETED | OUTPATIENT
Start: 2017-12-18 | End: 2017-12-18

## 2017-12-18 RX ADMIN — SODIUM CHLORIDE 250 ML: 9 INJECTION, SOLUTION INTRAVENOUS at 12:48

## 2017-12-18 RX ADMIN — SODIUM CHLORIDE 150 MG: 9 INJECTION, SOLUTION INTRAVENOUS at 12:50

## 2017-12-18 RX ADMIN — DEXAMETHASONE SODIUM PHOSPHATE 12 MG: 4 INJECTION, SOLUTION INTRAMUSCULAR; INTRAVENOUS at 12:50

## 2017-12-18 RX ADMIN — PALONOSETRON HYDROCHLORIDE 0.25 MG: 0.25 INJECTION INTRAVENOUS at 12:48

## 2017-12-18 RX ADMIN — CARBOPLATIN 430 MG: 10 INJECTION, SOLUTION INTRAVENOUS at 14:44

## 2017-12-18 RX ADMIN — ETOPOSIDE 170 MG: 20 INJECTION, SOLUTION, CONCENTRATE INTRAVENOUS at 13:33

## 2017-12-18 NOTE — PROGRESS NOTES
DATE OF VISIT: 12/18/2017    REASON FOR VISIT: Followup for limited stage small cell lung cancer     HISTORY OF PRESENT ILLNESS: The patient is a very pleasant 77 y.o. female  with past medical history significant for limited stage small cell lung cancer diagnosed August 19, 2017.  The patient status post wedge resection of left upper lobe mass done by Dr. Thornton, final pathology revealed 1.7 cm small cell lung cancer with positive surgical margin and one negative level IVR lymph node.  The patient was started on adjuvant chemotherapy using carboplatin and etoposide September 25, 2017. The patient is here today for cycle #4, day 1.    SUBJECTIVE: The patient has been doing fairly well. she is able to tolerate  her treatment without any serious side effects. she denied any fever or  chills, no night sweats, denied any headaches.  She is complaining of progressive fatigue.  She has been having some nausea but no vomiting.  Her son feels she's been having some mental status changes that lasts for about 10 days after chemotherapy then resolves completely.    PAST MEDICAL HISTORY/SOCIAL HISTORY/FAMILY HISTORY: Unchanged from my prior documentation done on September 18, 2017    Review of Systems   Constitutional: Positive for fatigue. Negative for activity change, appetite change, chills, fever and unexpected weight change.   HENT: Negative for hearing loss, mouth sores, nosebleeds, sore throat and trouble swallowing.    Eyes: Negative for visual disturbance.   Respiratory: Negative for cough, chest tightness, shortness of breath and wheezing.    Cardiovascular: Positive for leg swelling. Negative for chest pain and palpitations.   Gastrointestinal: Positive for nausea. Negative for abdominal distention, abdominal pain, blood in stool, constipation, diarrhea, rectal pain and vomiting.   Endocrine: Negative for cold intolerance and heat intolerance.   Genitourinary: Negative for difficulty urinating, dysuria, frequency and  urgency.   Musculoskeletal: Negative for arthralgias, back pain, gait problem, joint swelling and myalgias.   Skin: Negative for rash.   Neurological: Positive for weakness. Negative for dizziness, tremors, syncope, light-headedness, numbness and headaches.   Hematological: Negative for adenopathy. Does not bruise/bleed easily.   Psychiatric/Behavioral: Negative for confusion, sleep disturbance and suicidal ideas. The patient is not nervous/anxious.          Current Outpatient Prescriptions:   •  aspirin 81 MG EC tablet, Take 81 mg by mouth Daily., Disp: , Rfl:   •  bisoprolol (ZEBeta) 5 MG tablet, Take 5 mg by mouth Daily., Disp: , Rfl:   •  cetirizine (zyrTEC) 10 MG tablet, Take 10 mg by mouth Daily., Disp: , Rfl:   •  clopidogrel (PLAVIX) 75 MG tablet, Take 75 mg by mouth Daily., Disp: , Rfl:   •  diphenoxylate-atropine (LOMOTIL) 2.5-0.025 MG per tablet, Take 1 tablet by mouth Every 6 (Six) Hours As Needed for Diarrhea. 1 tablet, Disp: 30 tablet, Rfl: 5  •  fesoterodine fumarate (TOVIAZ ER) 8 MG tablet sustained-release 24 hour capsule, Take  by mouth Daily., Disp: , Rfl:   •  gabapentin (NEURONTIN) 100 MG capsule, Take 100 mg by mouth 3 (Three) Times a Day., Disp: , Rfl:   •  ibuprofen (ADVIL,MOTRIN) 800 MG tablet, , Disp: , Rfl:   •  Lansoprazole (PREVACID PO), Take  by mouth., Disp: , Rfl:   •  lisinopril (PRINIVIL,ZESTRIL) 40 MG tablet, Take 40 mg by mouth Daily., Disp: , Rfl:   •  omeprazole (priLOSEC) 40 MG capsule, , Disp: , Rfl:   •  ondansetron (ZOFRAN) 8 MG tablet, Take 1 tablet by mouth 3 (Three) Times a Day As Needed for Nausea or Vomiting., Disp: 30 tablet, Rfl: 5  •  simvastatin (ZOCOR) 20 MG tablet, Take 20 mg by mouth Every Night., Disp: , Rfl:   •  SITagliptin (JANUVIA) 100 MG tablet, Take 100 mg by mouth Daily., Disp: , Rfl:   •  sulfamethoxazole-trimethoprim (BACTRIM DS,SEPTRA DS) 800-160 MG per tablet, Take 1 tablet by mouth 2 (Two) Times a Day. 1 tab BID for 10 days, Disp: 20 tablet, Rfl:  0    PHYSICAL EXAMINATION:   There were no vitals taken for this visit.   ECOG Performance Status: 0 - Asymptomatic  General Appearance:  alert, cooperative, no apparent distress and appears stated age   Neurologic/Psychiatric: A&O x 3, gait steady, appropriate affect, strength 5/5 in all muscle groups   HEENT:  Normocephalic, without obvious abnormality, mucous membranes moist   Neck: Supple, symmetrical, trachea midline, no adenopathy;  No thyromegaly, masses, or tenderness   Lungs:   Clear to auscultation bilaterally; respirations regular, even, and unlabored bilaterally   Heart:  Regular rate and rhythm, no murmurs appreciated   Abdomen:   Soft, non-tender, non-distended and no organomegaly   Lymph nodes: No cervical, supraclavicular, inguinal or axillary adenopathy noted   Extremities: Normal, atraumatic; no clubbing, cyanosis, or edema    Skin: No rashes, ulcers, or suspicious lesions noted     No visits with results within 2 Week(s) from this visit.  Latest known visit with results is:    Infusion on 11/20/2017   Component Date Value Ref Range Status   • Creatinine 11/20/2017 1.00  0.60 - 1.30 mg/dL Final    Serial Number: [id number]:  [id number]        No results found.    ASSESSMENT: The patient is a very pleasant 77 y.o. female  with limited stage small cell lung cancer    PROBLEM LIST:  1.Stage I a small cell lung cancer [id number]:  A.  Presented with shortening of breath and dry cough  B.  Whole body PET scan done July 17, 2017 revealed 2 cm hypermetabolic left upper lobe nodule  C.  Status post wedge resection done by Dr. Thornton August 16, 2017 pathology revealed 1.7 cm small cell carcinoma with focally positive margin and one negative level IVR lymph node.  D. Started adjuvant chemotherapy with carboplatin and etoposide September 25, 2017, status post 3 cycle  2.  Type 2 diabetes  3.  Hypertension  4.  Chronic diarrhea   5.  Chemotherapy induced anemia  6. Tobacco abuse    PLAN:  1.  I will proceed with  chemotherapy as scheduled today.  2.  The patient will follow up with me in 4 weeks to evaluate for treatment tolerance.   3.  I'll monitor patient blood work including blood counts, kidney function, and liver functions throughout treatment.   4. We reviewed the potential side effects of this regimen including fatigue, vomiting and nausea, hair loss, nephropathy, neuropathy, hearing loss, myelosuppression, risk of infusion reaction, and potential death.  5.  I will continue patient on Zofran as needed for chemotherapy-induced nausea.  6.  I'll repeat her scans after completion of chemotherapy to consider adjuvant radiation at that point.  Patient is not a candidate for concurrent treatment modality given her age as well as borderline performance status. We will order CAT scans for prior to return.   7.  I'll consider restarting patient on Lasix if her lower extremity edema worsens.  8.  We'll consider inserting Port-A-Cath if needed.  9.  We'll continue Imodium as well as Lomotil as needed for diarrhea.  10.  We'll transfuse 2 units of blood as needed for hemoglobin less than 8.    Scribed for Damaris Martin MD by BRENDA Bonilla. 12/18/2017  11:17 AM  Damaris Martin MD  12/18/2017   I, Damaris Martin MD, personally performed the services described in this documentation as scribed by the above named individual in my presence, and it is both accurate and complete.  12/18/2017  12:09 PM

## 2017-12-19 ENCOUNTER — INFUSION (OUTPATIENT)
Dept: ONCOLOGY | Facility: HOSPITAL | Age: 77
End: 2017-12-19

## 2017-12-19 VITALS
HEART RATE: 69 BPM | WEIGHT: 144 LBS | RESPIRATION RATE: 16 BRPM | BODY MASS INDEX: 26.5 KG/M2 | SYSTOLIC BLOOD PRESSURE: 141 MMHG | TEMPERATURE: 98.1 F | DIASTOLIC BLOOD PRESSURE: 66 MMHG | HEIGHT: 62 IN

## 2017-12-19 DIAGNOSIS — C34.90 SMALL CELL CARCINOMA OF LUNG, UNSPECIFIED LATERALITY: ICD-10-CM

## 2017-12-19 DIAGNOSIS — C34.90 SMALL CELL CARCINOMA OF LUNG, UNSPECIFIED LATERALITY: Primary | ICD-10-CM

## 2017-12-19 DIAGNOSIS — C34.90 SCLC (SMALL CELL LUNG CARCINOMA), UNSPECIFIED LATERALITY (HCC): Primary | ICD-10-CM

## 2017-12-19 LAB
ABO GROUP BLD: NORMAL
BLD GP AB SCN SERPL QL: NEGATIVE
RH BLD: POSITIVE

## 2017-12-19 PROCEDURE — 25010000002 ETOPOSIDE 100 MG/5ML SOLUTION 50 ML VIAL: Performed by: INTERNAL MEDICINE

## 2017-12-19 PROCEDURE — 96375 TX/PRO/DX INJ NEW DRUG ADDON: CPT

## 2017-12-19 PROCEDURE — 25010000002 DEXAMETHASONE PER 1 MG: Performed by: INTERNAL MEDICINE

## 2017-12-19 PROCEDURE — 36415 COLL VENOUS BLD VENIPUNCTURE: CPT

## 2017-12-19 PROCEDURE — 86900 BLOOD TYPING SEROLOGIC ABO: CPT

## 2017-12-19 PROCEDURE — 86850 RBC ANTIBODY SCREEN: CPT

## 2017-12-19 PROCEDURE — 96413 CHEMO IV INFUSION 1 HR: CPT

## 2017-12-19 PROCEDURE — 86901 BLOOD TYPING SEROLOGIC RH(D): CPT

## 2017-12-19 PROCEDURE — 86920 COMPATIBILITY TEST SPIN: CPT

## 2017-12-19 RX ORDER — ACETAMINOPHEN 325 MG/1
650 TABLET ORAL ONCE
Status: CANCELLED | OUTPATIENT
Start: 2017-12-19 | End: 2017-12-19

## 2017-12-19 RX ORDER — SODIUM CHLORIDE 9 MG/ML
250 INJECTION, SOLUTION INTRAVENOUS AS NEEDED
Status: CANCELLED | OUTPATIENT
Start: 2017-12-19

## 2017-12-19 RX ORDER — DIPHENHYDRAMINE HCL 25 MG
25 CAPSULE ORAL ONCE
Status: CANCELLED | OUTPATIENT
Start: 2017-12-19 | End: 2017-12-19

## 2017-12-19 RX ADMIN — ETOPOSIDE 170 MG: 20 INJECTION, SOLUTION, CONCENTRATE INTRAVENOUS at 14:11

## 2017-12-19 RX ADMIN — DEXAMETHASONE SODIUM PHOSPHATE 12 MG: 4 INJECTION, SOLUTION INTRAMUSCULAR; INTRAVENOUS at 13:43

## 2017-12-20 ENCOUNTER — INFUSION (OUTPATIENT)
Dept: ONCOLOGY | Facility: HOSPITAL | Age: 77
End: 2017-12-20

## 2017-12-20 VITALS
SYSTOLIC BLOOD PRESSURE: 177 MMHG | RESPIRATION RATE: 16 BRPM | DIASTOLIC BLOOD PRESSURE: 60 MMHG | HEART RATE: 63 BPM | WEIGHT: 149 LBS | TEMPERATURE: 98.7 F | BODY MASS INDEX: 27.42 KG/M2 | HEIGHT: 62 IN

## 2017-12-20 DIAGNOSIS — C34.90 SCLC (SMALL CELL LUNG CARCINOMA), UNSPECIFIED LATERALITY (HCC): ICD-10-CM

## 2017-12-20 DIAGNOSIS — C34.90 SMALL CELL CARCINOMA OF LUNG, UNSPECIFIED LATERALITY: Primary | ICD-10-CM

## 2017-12-20 PROCEDURE — 25010000002 PEGFILGRASTIM 6 MG/0.6ML PREFILLED SYRINGE KIT: Performed by: INTERNAL MEDICINE

## 2017-12-20 PROCEDURE — 96367 TX/PROPH/DG ADDL SEQ IV INF: CPT

## 2017-12-20 PROCEDURE — 86900 BLOOD TYPING SEROLOGIC ABO: CPT

## 2017-12-20 PROCEDURE — 96377 APPLICATON ON-BODY INJECTOR: CPT

## 2017-12-20 PROCEDURE — 96375 TX/PRO/DX INJ NEW DRUG ADDON: CPT

## 2017-12-20 PROCEDURE — 63710000001 DIPHENHYDRAMINE PER 50 MG: Performed by: INTERNAL MEDICINE

## 2017-12-20 PROCEDURE — 25010000002 DEXAMETHASONE PER 1 MG: Performed by: INTERNAL MEDICINE

## 2017-12-20 PROCEDURE — 96413 CHEMO IV INFUSION 1 HR: CPT

## 2017-12-20 PROCEDURE — P9016 RBC LEUKOCYTES REDUCED: HCPCS

## 2017-12-20 PROCEDURE — 36430 TRANSFUSION BLD/BLD COMPNT: CPT

## 2017-12-20 PROCEDURE — 25010000002 ETOPOSIDE 100 MG/5ML SOLUTION 50 ML VIAL: Performed by: INTERNAL MEDICINE

## 2017-12-20 RX ORDER — SODIUM CHLORIDE 9 MG/ML
250 INJECTION, SOLUTION INTRAVENOUS AS NEEDED
Status: DISCONTINUED | OUTPATIENT
Start: 2017-12-20 | End: 2017-12-20 | Stop reason: HOSPADM

## 2017-12-20 RX ORDER — DIPHENHYDRAMINE HCL 25 MG
25 CAPSULE ORAL ONCE
Status: COMPLETED | OUTPATIENT
Start: 2017-12-20 | End: 2017-12-20

## 2017-12-20 RX ORDER — ACETAMINOPHEN 325 MG/1
650 TABLET ORAL ONCE
Status: COMPLETED | OUTPATIENT
Start: 2017-12-20 | End: 2017-12-20

## 2017-12-20 RX ORDER — SODIUM CHLORIDE 9 MG/ML
250 INJECTION, SOLUTION INTRAVENOUS ONCE
Status: COMPLETED | OUTPATIENT
Start: 2017-12-20 | End: 2017-12-20

## 2017-12-20 RX ADMIN — SODIUM CHLORIDE 250 ML: 9 INJECTION, SOLUTION INTRAVENOUS at 13:21

## 2017-12-20 RX ADMIN — ACETAMINOPHEN 650 MG: 325 TABLET, FILM COATED ORAL at 10:22

## 2017-12-20 RX ADMIN — DEXAMETHASONE SODIUM PHOSPHATE 12 MG: 4 INJECTION, SOLUTION INTRAMUSCULAR; INTRAVENOUS at 13:21

## 2017-12-20 RX ADMIN — PEGFILGRASTIM 6 MG: KIT SUBCUTANEOUS at 14:49

## 2017-12-20 RX ADMIN — ETOPOSIDE 170 MG: 20 INJECTION, SOLUTION, CONCENTRATE INTRAVENOUS at 13:43

## 2017-12-20 RX ADMIN — DIPHENHYDRAMINE HYDROCHLORIDE 25 MG: 25 CAPSULE ORAL at 10:22

## 2017-12-21 LAB
ABO + RH BLD: NORMAL
BH BB BLOOD EXPIRATION DATE: NORMAL
BH BB BLOOD TYPE BARCODE: 5100
BH BB DISPENSE STATUS: NORMAL
BH BB PRODUCT CODE: NORMAL
BH BB UNIT NUMBER: NORMAL
CROSSMATCH INTERPRETATION: NORMAL
UNIT  ABO: NORMAL
UNIT  RH: NORMAL

## 2017-12-29 ENCOUNTER — TELEPHONE (OUTPATIENT)
Dept: ONCOLOGY | Facility: CLINIC | Age: 77
End: 2017-12-29

## 2017-12-29 RX ORDER — AZITHROMYCIN 250 MG/1
TABLET, FILM COATED ORAL
Qty: 6 TABLET | Refills: 0 | Status: SHIPPED | OUTPATIENT
Start: 2017-12-29

## 2017-12-29 NOTE — TELEPHONE ENCOUNTER
Z-yasmani sent to the pharmacy per Paola, advised can take OTC medications for symptoms as well.   If s/s do not improve, advised to contact PCP for evaluation and possible flu test

## 2017-12-29 NOTE — TELEPHONE ENCOUNTER
----- Message from Octavio Martinez sent at 12/29/2017 10:37 AM EST -----  Regarding: Veronica, should patient go to ER?  Contact: [phone]  Jossie Gates called to state that patient has a fever of 100.7, cough, cough up yellow colored mucus, runny nose, and chilling. They want to know if they should go to the ER.

## 2018-01-01 ENCOUNTER — LAB (OUTPATIENT)
Dept: LAB | Facility: HOSPITAL | Age: 78
End: 2018-01-01

## 2018-01-01 ENCOUNTER — HOSPITAL ENCOUNTER (OUTPATIENT)
Dept: CT IMAGING | Facility: HOSPITAL | Age: 78
Discharge: HOME | End: 2018-04-30
Attending: INTERNAL MEDICINE | Admitting: INTERNAL MEDICINE

## 2018-01-01 ENCOUNTER — OFFICE VISIT (OUTPATIENT)
Dept: ONCOLOGY | Facility: CLINIC | Age: 78
End: 2018-01-01

## 2018-01-01 ENCOUNTER — TELEPHONE (OUTPATIENT)
Dept: ONCOLOGY | Facility: CLINIC | Age: 78
End: 2018-01-01

## 2018-01-01 ENCOUNTER — HOSPITAL ENCOUNTER (OUTPATIENT)
Dept: CT IMAGING | Facility: HOSPITAL | Age: 78
Discharge: HOME | End: 2018-07-30
Attending: INTERNAL MEDICINE | Admitting: INTERNAL MEDICINE

## 2018-01-01 ENCOUNTER — HOSPITAL ENCOUNTER (OUTPATIENT)
Dept: CT IMAGING | Facility: HOSPITAL | Age: 78
Discharge: HOME | End: 2018-01-22
Admitting: NURSE PRACTITIONER

## 2018-01-01 ENCOUNTER — HOSPITAL ENCOUNTER (OUTPATIENT)
Dept: CT IMAGING | Facility: HOSPITAL | Age: 78
Discharge: HOME | End: 2018-12-03
Attending: INTERNAL MEDICINE | Admitting: INTERNAL MEDICINE

## 2018-01-01 VITALS
RESPIRATION RATE: 16 BRPM | SYSTOLIC BLOOD PRESSURE: 115 MMHG | TEMPERATURE: 97.6 F | DIASTOLIC BLOOD PRESSURE: 56 MMHG | WEIGHT: 141 LBS | HEIGHT: 62 IN | BODY MASS INDEX: 25.95 KG/M2 | HEART RATE: 72 BPM

## 2018-01-01 VITALS
TEMPERATURE: 97.9 F | WEIGHT: 157.4 LBS | RESPIRATION RATE: 18 BRPM | OXYGEN SATURATION: 94 % | HEART RATE: 72 BPM | HEIGHT: 62 IN | SYSTOLIC BLOOD PRESSURE: 201 MMHG | BODY MASS INDEX: 28.97 KG/M2 | DIASTOLIC BLOOD PRESSURE: 73 MMHG

## 2018-01-01 VITALS
HEART RATE: 63 BPM | WEIGHT: 143 LBS | DIASTOLIC BLOOD PRESSURE: 81 MMHG | RESPIRATION RATE: 15 BRPM | SYSTOLIC BLOOD PRESSURE: 203 MMHG | BODY MASS INDEX: 26.31 KG/M2 | HEIGHT: 62 IN | TEMPERATURE: 98 F

## 2018-01-01 VITALS
DIASTOLIC BLOOD PRESSURE: 88 MMHG | BODY MASS INDEX: 26.31 KG/M2 | SYSTOLIC BLOOD PRESSURE: 138 MMHG | HEART RATE: 70 BPM | RESPIRATION RATE: 16 BRPM | OXYGEN SATURATION: 98 % | HEIGHT: 62 IN | WEIGHT: 143 LBS | TEMPERATURE: 97 F

## 2018-01-01 DIAGNOSIS — C34.90 SMALL CELL CARCINOMA OF LUNG, UNSPECIFIED LATERALITY: Primary | ICD-10-CM

## 2018-01-01 DIAGNOSIS — C34.90 SMALL CELL CARCINOMA OF LUNG, UNSPECIFIED LATERALITY: ICD-10-CM

## 2018-01-01 DIAGNOSIS — C34.90 SCLC (SMALL CELL LUNG CARCINOMA) (HCC): Primary | ICD-10-CM

## 2018-01-01 DIAGNOSIS — C34.12 CANCER OF UPPER LOBE OF LEFT LUNG (HCC): Primary | ICD-10-CM

## 2018-01-01 DIAGNOSIS — C34.90 SMALL CELL CARCINOMA OF LUNG (HCC): ICD-10-CM

## 2018-01-01 DIAGNOSIS — C34.12 CANCER OF UPPER LOBE OF LEFT LUNG (HCC): ICD-10-CM

## 2018-01-01 LAB
ALBUMIN SERPL-MCNC: 3.8 G/DL (ref 3.2–4.8)
ALBUMIN SERPL-MCNC: 4.04 G/DL (ref 3.2–4.8)
ALBUMIN SERPL-MCNC: 4.2 G/DL (ref 3.2–4.8)
ALBUMIN SERPL-MCNC: 4.36 G/DL (ref 3.2–4.8)
ALBUMIN/GLOB SERPL: 1.4 G/DL (ref 1.5–2.5)
ALBUMIN/GLOB SERPL: 1.7 G/DL (ref 1.5–2.5)
ALBUMIN/GLOB SERPL: 2 G/DL (ref 1.5–2.5)
ALBUMIN/GLOB SERPL: 2.1 G/DL (ref 1.5–2.5)
ALP SERPL-CCNC: 59 U/L (ref 25–100)
ALP SERPL-CCNC: 67 U/L (ref 25–100)
ALP SERPL-CCNC: 69 U/L (ref 25–100)
ALP SERPL-CCNC: 70 U/L (ref 25–100)
ALT SERPL W P-5'-P-CCNC: 11 U/L (ref 7–40)
ALT SERPL W P-5'-P-CCNC: 11 U/L (ref 7–40)
ALT SERPL W P-5'-P-CCNC: 19 U/L (ref 7–40)
ALT SERPL W P-5'-P-CCNC: 5 U/L (ref 7–40)
ANION GAP SERPL CALCULATED.3IONS-SCNC: 4 MMOL/L (ref 3–11)
ANION GAP SERPL CALCULATED.3IONS-SCNC: 5 MMOL/L (ref 3–11)
ANION GAP SERPL CALCULATED.3IONS-SCNC: 6 MMOL/L (ref 3–11)
ANION GAP SERPL CALCULATED.3IONS-SCNC: 8 MMOL/L (ref 3–11)
AST SERPL-CCNC: 16 U/L (ref 0–33)
AST SERPL-CCNC: 16 U/L (ref 0–33)
AST SERPL-CCNC: 19 U/L (ref 0–33)
AST SERPL-CCNC: 26 U/L (ref 0–33)
BASOPHILS # BLD AUTO: 0.01 10*3/MM3 (ref 0–0.2)
BASOPHILS # BLD AUTO: 0.01 10*3/MM3 (ref 0–0.2)
BASOPHILS # BLD AUTO: 0.02 10*3/MM3 (ref 0–0.2)
BASOPHILS # BLD AUTO: 0.03 10*3/MM3 (ref 0–0.2)
BASOPHILS NFR BLD AUTO: 0.2 % (ref 0–1)
BASOPHILS NFR BLD AUTO: 0.2 % (ref 0–1)
BASOPHILS NFR BLD AUTO: 0.3 % (ref 0–1)
BASOPHILS NFR BLD AUTO: 0.5 % (ref 0–1)
BILIRUB SERPL-MCNC: 0.2 MG/DL (ref 0.3–1.2)
BILIRUB SERPL-MCNC: 0.3 MG/DL (ref 0.3–1.2)
BILIRUB SERPL-MCNC: 0.4 MG/DL (ref 0.3–1.2)
BILIRUB SERPL-MCNC: 0.4 MG/DL (ref 0.3–1.2)
BUN BLD-MCNC: 13 MG/DL (ref 9–23)
BUN BLD-MCNC: 14 MG/DL (ref 9–23)
BUN BLD-MCNC: 15 MG/DL (ref 9–23)
BUN BLD-MCNC: 16 MG/DL (ref 9–23)
BUN/CREAT SERPL: 13.5 (ref 7–25)
BUN/CREAT SERPL: 15.6 (ref 7–25)
BUN/CREAT SERPL: 15.8 (ref 7–25)
BUN/CREAT SERPL: 17.8 (ref 7–25)
CALCIUM SPEC-SCNC: 9.1 MG/DL (ref 8.7–10.4)
CALCIUM SPEC-SCNC: 9.3 MG/DL (ref 8.7–10.4)
CALCIUM SPEC-SCNC: 9.4 MG/DL (ref 8.7–10.4)
CALCIUM SPEC-SCNC: 9.5 MG/DL (ref 8.7–10.4)
CHLORIDE SERPL-SCNC: 101 MMOL/L (ref 99–109)
CHLORIDE SERPL-SCNC: 102 MMOL/L (ref 99–109)
CHLORIDE SERPL-SCNC: 104 MMOL/L (ref 99–109)
CHLORIDE SERPL-SCNC: 99 MMOL/L (ref 99–109)
CO2 SERPL-SCNC: 28 MMOL/L (ref 20–31)
CO2 SERPL-SCNC: 31 MMOL/L (ref 20–31)
CO2 SERPL-SCNC: 33 MMOL/L (ref 20–31)
CO2 SERPL-SCNC: 35 MMOL/L (ref 20–31)
CREAT BLD-MCNC: 0.9 MG/DL (ref 0.6–1.3)
CREAT BLD-MCNC: 0.9 MG/DL (ref 0.6–1.3)
CREAT BLD-MCNC: 0.95 MG/DL (ref 0.6–1.3)
CREAT BLD-MCNC: 0.96 MG/DL (ref 0.6–1.3)
CREAT BLDA-MCNC: 0.9 MG/DL (ref 0.6–1.3)
CREAT BLDA-MCNC: 1 MG/DL (ref 0.6–1.3)
DEPRECATED RDW RBC AUTO: 52.6 FL (ref 37–54)
DEPRECATED RDW RBC AUTO: 53.5 FL (ref 37–54)
DEPRECATED RDW RBC AUTO: 56.7 FL (ref 37–54)
DEPRECATED RDW RBC AUTO: 65.9 FL (ref 37–54)
EOSINOPHIL # BLD AUTO: 0.07 10*3/MM3 (ref 0–0.3)
EOSINOPHIL # BLD AUTO: 0.13 10*3/MM3 (ref 0–0.3)
EOSINOPHIL # BLD AUTO: 0.13 10*3/MM3 (ref 0–0.3)
EOSINOPHIL # BLD AUTO: 0.22 10*3/MM3 (ref 0–0.3)
EOSINOPHIL NFR BLD AUTO: 1 % (ref 0–3)
EOSINOPHIL NFR BLD AUTO: 2.5 % (ref 0–3)
EOSINOPHIL NFR BLD AUTO: 2.7 % (ref 0–3)
EOSINOPHIL NFR BLD AUTO: 3.8 % (ref 0–3)
ERYTHROCYTE [DISTWIDTH] IN BLOOD BY AUTOMATED COUNT: 14.5 % (ref 11.3–14.5)
ERYTHROCYTE [DISTWIDTH] IN BLOOD BY AUTOMATED COUNT: 14.6 % (ref 11.3–14.5)
ERYTHROCYTE [DISTWIDTH] IN BLOOD BY AUTOMATED COUNT: 15.6 % (ref 11.3–14.5)
ERYTHROCYTE [DISTWIDTH] IN BLOOD BY AUTOMATED COUNT: 17.1 % (ref 11.3–14.5)
GFR SERPL CREATININE-BSD FRML MDRD: 56 ML/MIN/1.73
GFR SERPL CREATININE-BSD FRML MDRD: 57 ML/MIN/1.73
GFR SERPL CREATININE-BSD FRML MDRD: 61 ML/MIN/1.73
GFR SERPL CREATININE-BSD FRML MDRD: 61 ML/MIN/1.73
GLOBULIN UR ELPH-MCNC: 2 GM/DL
GLOBULIN UR ELPH-MCNC: 2.1 GM/DL
GLOBULIN UR ELPH-MCNC: 2.4 GM/DL
GLOBULIN UR ELPH-MCNC: 2.8 GM/DL
GLUCOSE BLD-MCNC: 112 MG/DL (ref 70–100)
GLUCOSE BLD-MCNC: 113 MG/DL (ref 70–100)
GLUCOSE BLD-MCNC: 114 MG/DL (ref 70–100)
GLUCOSE BLD-MCNC: 154 MG/DL (ref 70–100)
HCT VFR BLD AUTO: 28.4 % (ref 34.5–44)
HCT VFR BLD AUTO: 33.1 % (ref 34.5–44)
HCT VFR BLD AUTO: 34.1 % (ref 34.5–44)
HCT VFR BLD AUTO: 36.6 % (ref 34.5–44)
HGB BLD-MCNC: 10.2 G/DL (ref 11.5–15.5)
HGB BLD-MCNC: 10.5 G/DL (ref 11.5–15.5)
HGB BLD-MCNC: 11.4 G/DL (ref 11.5–15.5)
HGB BLD-MCNC: 8.7 G/DL (ref 11.5–15.5)
IMM GRANULOCYTES # BLD: 0 10*3/MM3 (ref 0–0.03)
IMM GRANULOCYTES # BLD: 0.01 10*3/MM3 (ref 0–0.03)
IMM GRANULOCYTES # BLD: 0.01 10*3/MM3 (ref 0–0.03)
IMM GRANULOCYTES # BLD: 0.04 10*3/MM3 (ref 0–0.03)
IMM GRANULOCYTES NFR BLD: 0 % (ref 0–0.6)
IMM GRANULOCYTES NFR BLD: 0.2 % (ref 0–0.6)
IMM GRANULOCYTES NFR BLD: 0.2 % (ref 0–0.6)
IMM GRANULOCYTES NFR BLD: 0.6 % (ref 0–0.6)
LYMPHOCYTES # BLD AUTO: 1.12 10*3/MM3 (ref 0.6–4.8)
LYMPHOCYTES # BLD AUTO: 1.39 10*3/MM3 (ref 0.6–4.8)
LYMPHOCYTES # BLD AUTO: 1.82 10*3/MM3 (ref 0.6–4.8)
LYMPHOCYTES # BLD AUTO: 1.89 10*3/MM3 (ref 0.6–4.8)
LYMPHOCYTES NFR BLD AUTO: 23 % (ref 24–44)
LYMPHOCYTES NFR BLD AUTO: 26.4 % (ref 24–44)
LYMPHOCYTES NFR BLD AUTO: 26.4 % (ref 24–44)
LYMPHOCYTES NFR BLD AUTO: 32.3 % (ref 24–44)
MCH RBC QN AUTO: 30.4 PG (ref 27–31)
MCH RBC QN AUTO: 30.6 PG (ref 27–31)
MCH RBC QN AUTO: 31.5 PG (ref 27–31)
MCH RBC QN AUTO: 32.1 PG (ref 27–31)
MCHC RBC AUTO-ENTMCNC: 30.6 G/DL (ref 32–36)
MCHC RBC AUTO-ENTMCNC: 30.8 G/DL (ref 32–36)
MCHC RBC AUTO-ENTMCNC: 30.8 G/DL (ref 32–36)
MCHC RBC AUTO-ENTMCNC: 31.1 G/DL (ref 32–36)
MCV RBC AUTO: 101.1 FL (ref 80–99)
MCV RBC AUTO: 104.8 FL (ref 80–99)
MCV RBC AUTO: 98.8 FL (ref 80–99)
MCV RBC AUTO: 99.4 FL (ref 80–99)
MONOCYTES # BLD AUTO: 0.31 10*3/MM3 (ref 0–1)
MONOCYTES # BLD AUTO: 0.44 10*3/MM3 (ref 0–1)
MONOCYTES # BLD AUTO: 0.48 10*3/MM3 (ref 0–1)
MONOCYTES # BLD AUTO: 0.53 10*3/MM3 (ref 0–1)
MONOCYTES NFR BLD AUTO: 6.4 % (ref 0–12)
MONOCYTES NFR BLD AUTO: 7.5 % (ref 0–12)
MONOCYTES NFR BLD AUTO: 7.7 % (ref 0–12)
MONOCYTES NFR BLD AUTO: 9.1 % (ref 0–12)
NEUTROPHILS # BLD AUTO: 3.24 10*3/MM3 (ref 1.5–8.3)
NEUTROPHILS # BLD AUTO: 3.27 10*3/MM3 (ref 1.5–8.3)
NEUTROPHILS # BLD AUTO: 3.31 10*3/MM3 (ref 1.5–8.3)
NEUTROPHILS # BLD AUTO: 4.46 10*3/MM3 (ref 1.5–8.3)
NEUTROPHILS NFR BLD AUTO: 55.9 % (ref 41–71)
NEUTROPHILS NFR BLD AUTO: 61.6 % (ref 41–71)
NEUTROPHILS NFR BLD AUTO: 64.6 % (ref 41–71)
NEUTROPHILS NFR BLD AUTO: 67.7 % (ref 41–71)
NRBC BLD MANUAL-RTO: 0 /100 WBC (ref 0–0)
PLATELET # BLD AUTO: 108 10*3/MM3 (ref 150–450)
PLATELET # BLD AUTO: 109 10*3/MM3 (ref 150–450)
PLATELET # BLD AUTO: 132 10*3/MM3 (ref 150–450)
PLATELET # BLD AUTO: 186 10*3/MM3 (ref 150–450)
PMV BLD AUTO: 8.8 FL (ref 6–12)
PMV BLD AUTO: 9.5 FL (ref 6–12)
PMV BLD AUTO: 9.7 FL (ref 6–12)
PMV BLD AUTO: 9.8 FL (ref 6–12)
POTASSIUM BLD-SCNC: 3.9 MMOL/L (ref 3.5–5.5)
POTASSIUM BLD-SCNC: 4.1 MMOL/L (ref 3.5–5.5)
POTASSIUM BLD-SCNC: 4.7 MMOL/L (ref 3.5–5.5)
POTASSIUM BLD-SCNC: 4.8 MMOL/L (ref 3.5–5.5)
PROT SERPL-MCNC: 6.3 G/DL (ref 5.7–8.2)
PROT SERPL-MCNC: 6.4 G/DL (ref 5.7–8.2)
PROT SERPL-MCNC: 6.4 G/DL (ref 5.7–8.2)
PROT SERPL-MCNC: 6.6 G/DL (ref 5.7–8.2)
RBC # BLD AUTO: 2.71 10*6/MM3 (ref 3.89–5.14)
RBC # BLD AUTO: 3.35 10*6/MM3 (ref 3.89–5.14)
RBC # BLD AUTO: 3.43 10*6/MM3 (ref 3.89–5.14)
RBC # BLD AUTO: 3.62 10*6/MM3 (ref 3.89–5.14)
SODIUM BLD-SCNC: 138 MMOL/L (ref 132–146)
SODIUM BLD-SCNC: 142 MMOL/L (ref 132–146)
WBC NRBC COR # BLD: 4.88 10*3/MM3 (ref 3.5–10.8)
WBC NRBC COR # BLD: 5.26 10*3/MM3 (ref 3.5–10.8)
WBC NRBC COR # BLD: 5.85 10*3/MM3 (ref 3.5–10.8)
WBC NRBC COR # BLD: 6.9 10*3/MM3 (ref 3.5–10.8)

## 2018-01-01 PROCEDURE — 25010000002 IOPAMIDOL 61 % SOLUTION: Performed by: INTERNAL MEDICINE

## 2018-01-01 PROCEDURE — 80053 COMPREHEN METABOLIC PANEL: CPT

## 2018-01-01 PROCEDURE — 74177 CT ABD & PELVIS W/CONTRAST: CPT

## 2018-01-01 PROCEDURE — 85025 COMPLETE CBC W/AUTO DIFF WBC: CPT

## 2018-01-01 PROCEDURE — 99214 OFFICE O/P EST MOD 30 MIN: CPT | Performed by: NURSE PRACTITIONER

## 2018-01-01 PROCEDURE — 99214 OFFICE O/P EST MOD 30 MIN: CPT | Performed by: INTERNAL MEDICINE

## 2018-01-01 PROCEDURE — 82565 ASSAY OF CREATININE: CPT

## 2018-01-01 PROCEDURE — 36415 COLL VENOUS BLD VENIPUNCTURE: CPT

## 2018-01-01 PROCEDURE — 71260 CT THORAX DX C+: CPT

## 2018-01-01 PROCEDURE — 0 IOPAMIDOL 61 % SOLUTION: Performed by: NURSE PRACTITIONER

## 2018-01-01 RX ADMIN — IOPAMIDOL 100 ML: 612 INJECTION, SOLUTION INTRAVENOUS at 14:45

## 2018-01-01 RX ADMIN — IOPAMIDOL 70 ML: 612 INJECTION, SOLUTION INTRAVENOUS at 10:00

## 2018-01-01 RX ADMIN — IOPAMIDOL 100 ML: 612 INJECTION, SOLUTION INTRAVENOUS at 10:25

## 2018-01-01 RX ADMIN — IOPAMIDOL 85 ML: 612 INJECTION, SOLUTION INTRAVENOUS at 11:08

## 2018-01-23 NOTE — PROGRESS NOTES
DATE OF VISIT: 1/23/2018    REASON FOR VISIT: Followup for limited stage small cell lung cancer     HISTORY OF PRESENT ILLNESS: The patient is a very pleasant 77 y.o. female  with past medical history significant for limited stage small cell lung cancer diagnosed August 19, 2017.  The patient status post wedge resection of left upper lobe mass done by Dr. Thornton, final pathology revealed 1.7 cm small cell lung cancer with positive surgical margin and one negative level IVR lymph node.  The patient was started on adjuvant chemotherapy using carboplatin and etoposide September 25, 2017. The patient completed cycle #4 of treatment on 12/20/2017. The patient is here today for scheduled follow up visit.    SUBJECTIVE: The patient has been doing fairly well. she is able to tolerate  her treatment without any serious side effects. she denied any fever or  chills, no night sweats, denied any headaches.  She is complaining of progressive fatigue.  She has been having some nausea but no vomiting.      PAST MEDICAL HISTORY/SOCIAL HISTORY/FAMILY HISTORY: Unchanged from my prior documentation done on September 18, 2017    Review of Systems   Constitutional: Positive for fatigue. Negative for activity change, appetite change, chills, fever and unexpected weight change.   HENT: Negative for hearing loss, mouth sores, nosebleeds, sore throat and trouble swallowing.    Eyes: Negative for visual disturbance.   Respiratory: Negative for cough, chest tightness, shortness of breath and wheezing.    Cardiovascular: Positive for leg swelling. Negative for chest pain and palpitations.   Gastrointestinal: Positive for nausea. Negative for abdominal distention, abdominal pain, blood in stool, constipation, diarrhea, rectal pain and vomiting.   Endocrine: Negative for cold intolerance and heat intolerance.   Genitourinary: Negative for difficulty urinating, dysuria, frequency and urgency.   Musculoskeletal: Negative for arthralgias, back  pain, gait problem, joint swelling and myalgias.   Skin: Negative for rash.   Neurological: Positive for weakness. Negative for dizziness, tremors, syncope, light-headedness, numbness and headaches.   Hematological: Negative for adenopathy. Does not bruise/bleed easily.   Psychiatric/Behavioral: Negative for confusion, sleep disturbance and suicidal ideas. The patient is not nervous/anxious.          Current Outpatient Prescriptions:   •  aspirin 81 MG EC tablet, Take 81 mg by mouth Daily., Disp: , Rfl:   •  azithromycin (ZITHROMAX Z-HALEIGH) 250 MG tablet, Take 2 tablets the first day, then 1 tablet daily for 4 days., Disp: 6 tablet, Rfl: 0  •  bisoprolol (ZEBeta) 5 MG tablet, Take 5 mg by mouth Daily., Disp: , Rfl:   •  cetirizine (zyrTEC) 10 MG tablet, Take 10 mg by mouth Daily., Disp: , Rfl:   •  clopidogrel (PLAVIX) 75 MG tablet, Take 75 mg by mouth Daily., Disp: , Rfl:   •  diphenoxylate-atropine (LOMOTIL) 2.5-0.025 MG per tablet, Take 1 tablet by mouth Every 6 (Six) Hours As Needed for Diarrhea. 1 tablet, Disp: 30 tablet, Rfl: 5  •  fesoterodine fumarate (TOVIAZ ER) 8 MG tablet sustained-release 24 hour capsule, Take  by mouth Daily., Disp: , Rfl:   •  gabapentin (NEURONTIN) 100 MG capsule, Take 100 mg by mouth 3 (Three) Times a Day., Disp: , Rfl:   •  ibuprofen (ADVIL,MOTRIN) 800 MG tablet, , Disp: , Rfl:   •  Lansoprazole (PREVACID PO), Take  by mouth., Disp: , Rfl:   •  lisinopril (PRINIVIL,ZESTRIL) 40 MG tablet, Take 40 mg by mouth Daily., Disp: , Rfl:   •  omeprazole (priLOSEC) 40 MG capsule, , Disp: , Rfl:   •  ondansetron (ZOFRAN) 8 MG tablet, Take 1 tablet by mouth 3 (Three) Times a Day As Needed for Nausea or Vomiting., Disp: 30 tablet, Rfl: 5  •  simvastatin (ZOCOR) 20 MG tablet, Take 20 mg by mouth Every Night., Disp: , Rfl:   •  SITagliptin (JANUVIA) 100 MG tablet, Take 100 mg by mouth Daily., Disp: , Rfl:   No current facility-administered medications for this visit.     PHYSICAL EXAMINATION:   There  were no vitals taken for this visit.   ECOG Performance Status: 0 - Asymptomatic  General Appearance:  alert, cooperative, no apparent distress and appears stated age   Neurologic/Psychiatric: A&O x 3, gait steady, appropriate affect, strength 5/5 in all muscle groups   HEENT:  Normocephalic, without obvious abnormality, mucous membranes moist   Neck: Supple, symmetrical, trachea midline, no adenopathy;  No thyromegaly, masses, or tenderness   Lungs:   Clear to auscultation bilaterally; respirations regular, even, and unlabored bilaterally   Heart:  Regular rate and rhythm, no murmurs appreciated   Abdomen:   Soft, non-tender, non-distended and no organomegaly   Lymph nodes: No cervical, supraclavicular, inguinal or axillary adenopathy noted   Extremities: Normal, atraumatic; no clubbing, cyanosis, or edema    Skin: No rashes, ulcers, or suspicious lesions noted     Lab on 01/22/2018   Component Date Value Ref Range Status   • Glucose 01/22/2018 154* 70 - 100 mg/dL Final   • BUN 01/22/2018 16  9 - 23 mg/dL Final   • Creatinine 01/22/2018 0.90  0.60 - 1.30 mg/dL Final   • Sodium 01/22/2018 138  132 - 146 mmol/L Final   • Potassium 01/22/2018 3.9  3.5 - 5.5 mmol/L Final   • Chloride 01/22/2018 99  99 - 109 mmol/L Final   • CO2 01/22/2018 31.0  20.0 - 31.0 mmol/L Final   • Calcium 01/22/2018 9.4  8.7 - 10.4 mg/dL Final   • Total Protein 01/22/2018 6.3  5.7 - 8.2 g/dL Final   • Albumin 01/22/2018 4.20  3.20 - 4.80 g/dL Final   • ALT (SGPT) 01/22/2018 5* 7 - 40 U/L Final   • AST (SGOT) 01/22/2018 16  0 - 33 U/L Final   • Alkaline Phosphatase 01/22/2018 70  25 - 100 U/L Final   • Total Bilirubin 01/22/2018 0.2* 0.3 - 1.2 mg/dL Final   • eGFR Non African Amer 01/22/2018 61  >60 mL/min/1.73 Final   • Globulin 01/22/2018 2.1  gm/dL Final   • A/G Ratio 01/22/2018 2.0  1.5 - 2.5 g/dL Final   • BUN/Creatinine Ratio 01/22/2018 17.8  7.0 - 25.0 Final   • Anion Gap 01/22/2018 8.0  3.0 - 11.0 mmol/L Final   • WBC 01/22/2018 5.85   3.50 - 10.80 10*3/mm3 Final   • RBC 01/22/2018 2.71* 3.89 - 5.14 10*6/mm3 Final   • Hemoglobin 01/22/2018 8.7* 11.5 - 15.5 g/dL Final   • Hematocrit 01/22/2018 28.4* 34.5 - 44.0 % Final   • MCV 01/22/2018 104.8* 80.0 - 99.0 fL Final   • MCH 01/22/2018 32.1* 27.0 - 31.0 pg Final   • MCHC 01/22/2018 30.6* 32.0 - 36.0 g/dL Final   • RDW 01/22/2018 17.1* 11.3 - 14.5 % Final   • RDW-SD 01/22/2018 65.9* 37.0 - 54.0 fl Final   • MPV 01/22/2018 8.8  6.0 - 12.0 fL Final   • Platelets 01/22/2018 186  150 - 450 10*3/mm3 Final   • Neutrophil % 01/22/2018 55.9  41.0 - 71.0 % Final   • Lymphocyte % 01/22/2018 32.3  24.0 - 44.0 % Final   • Monocyte % 01/22/2018 7.5  0.0 - 12.0 % Final   • Eosinophil % 01/22/2018 3.8* 0.0 - 3.0 % Final   • Basophil % 01/22/2018 0.5  0.0 - 1.0 % Final   • Immature Grans % 01/22/2018 0.0  0.0 - 0.6 % Final   • Neutrophils, Absolute 01/22/2018 3.27  1.50 - 8.30 10*3/mm3 Final   • Lymphocytes, Absolute 01/22/2018 1.89  0.60 - 4.80 10*3/mm3 Final   • Monocytes, Absolute 01/22/2018 0.44  0.00 - 1.00 10*3/mm3 Final   • Eosinophils, Absolute 01/22/2018 0.22  0.00 - 0.30 10*3/mm3 Final   • Basophils, Absolute 01/22/2018 0.03  0.00 - 0.20 10*3/mm3 Final   • Immature Grans, Absolute 01/22/2018 0.00  0.00 - 0.03 10*3/mm3 Final   Hospital Outpatient Visit on 01/22/2018   Component Date Value Ref Range Status   • Creatinine 01/22/2018 0.90  0.60 - 1.30 mg/dL Final    Serial Number: [id number]:  [id number]        No results found.    ASSESSMENT: The patient is a very pleasant 77 y.o. female  with limited stage small cell lung cancer    PROBLEM LIST:  1.Stage I a small cell lung cancer [id number]:  A.  Presented with shortening of breath and dry cough  B.  Whole body PET scan done July 17, 2017 revealed 2 cm hypermetabolic left upper lobe nodule  C.  Status post wedge resection done by Dr. Thornton August 16, 2017 pathology revealed 1.7 cm small cell carcinoma with focally positive margin and one negative level IVR  lymph node.  D. Started adjuvant chemotherapy with carboplatin and etoposide September 25, 2017, status post 3 cycle  2.  Type 2 diabetes  3.  Hypertension  4.  Chronic diarrhea   5.  Chemotherapy induced anemia  6. Tobacco abuse    PLAN:  1. I reviewed the CAT scan results with the patient and her family. I reviewed the images myself.  She does have chronic lung changes.  There is no evidence of residual or recurrent disease.  2.  The patient will follow up with me in 3 months with repeated blood work and CT scans.   3.  I'll monitor patient blood work including blood counts, kidney function, and liver functions throughout treatment.   4. We reviewed the potential side effects of this regimen including fatigue, vomiting and nausea, hair loss, nephropathy, neuropathy, hearing loss, myelosuppression, risk of infusion reaction, and potential death.  5.  I will continue patient on Zofran as needed for chemotherapy-induced nausea.  6.  I will refer patient to see radiation oncology in [place] to consider adjuvant radiation at that point.  Patient was not a candidate for concurrent treatment modality given her age as well as borderline performance status.   7.  I'll consider restarting patient on Lasix if her lower extremity edema worsens.  8.  We'll continue Imodium as well as Lomotil as needed for diarrhea.  9.  We'll transfuse 2 units of blood as needed for hemoglobin less than 8.  10.  Patient was advised to quit smoking.    Scribed for Damaris Martin MD by BRENDA Bonilla. 1/23/2018  10:18 AM  Damaris Martin MD  1/23/2018   I, Damaris Martin MD, personally performed the services described in this documentation as scribed by the above named individual in my presence, and it is both accurate and complete.  1/23/2018  11:44 AM

## 2018-04-30 NOTE — PROGRESS NOTES
DATE OF VISIT: 4/30/2018    REASON FOR VISIT: Followup for limited stage small cell lung cancer     HISTORY OF PRESENT ILLNESS: The patient is a very pleasant 77 y.o. female  with past medical history significant for limited stage small cell lung cancer diagnosed August 19, 2017.  The patient status post wedge resection of left upper lobe mass done by Dr. Thornton, final pathology revealed 1.7 cm small cell lung cancer with positive surgical margin and one negative level IVR lymph node.  The patient was started on adjuvant chemotherapy using carboplatin and etoposide September 25, 2017. The patient completed cycle #4 of treatment on 12/20/2017. The patient is here today for scheduled follow up visit.    SUBJECTIVE: The patient has been doing fairly well. Since her last visit here she completed adjuvant radiation in [place]. She tolerated the treatment fairly well. She continues to have some mild fatigue, although she is still fairly active. She quilts for several hours per day. She does not walk much, but stands a great deal of time. She has chronic pain in her legs that has been present for greater than 10 years. She has some mild swelling as well, with no redness or increased warmth. Her breathing has been stable. She continues to smoke occasionally. She is eating and drinking well. She has had no new headaches or visual changes. She has had no unexplained weight loss. She is accompanied to her visit today by her son and daughter-in-law.       PAST MEDICAL HISTORY/SOCIAL HISTORY/FAMILY HISTORY: Unchanged from my prior documentation done on September 18, 2017    Review of Systems   Constitutional: Positive for fatigue. Negative for activity change, appetite change, chills, fever and unexpected weight change.   HENT: Negative for hearing loss, mouth sores, nosebleeds, sore throat and trouble swallowing.    Eyes: Negative for visual disturbance.   Respiratory: Negative for cough, chest tightness, shortness of breath  and wheezing.    Cardiovascular: Positive for leg swelling. Negative for chest pain and palpitations.   Gastrointestinal: Negative for abdominal distention, abdominal pain, blood in stool, constipation, diarrhea, rectal pain and vomiting.   Endocrine: Negative for cold intolerance and heat intolerance.   Genitourinary: Negative for difficulty urinating, dysuria, frequency and urgency.   Musculoskeletal: Positive for arthralgias and myalgias. Negative for back pain, gait problem and joint swelling.   Skin: Negative for rash.   Neurological: Negative for dizziness, tremors, syncope, light-headedness, numbness and headaches.   Hematological: Negative for adenopathy. Does not bruise/bleed easily.   Psychiatric/Behavioral: Negative for confusion, sleep disturbance and suicidal ideas. The patient is not nervous/anxious.          Current Outpatient Prescriptions:   •  aspirin 81 MG EC tablet, Take 81 mg by mouth Daily., Disp: , Rfl:   •  azithromycin (ZITHROMAX Z-HALEIGH) 250 MG tablet, Take 2 tablets the first day, then 1 tablet daily for 4 days., Disp: 6 tablet, Rfl: 0  •  bisoprolol (ZEBeta) 5 MG tablet, Take 5 mg by mouth Daily., Disp: , Rfl:   •  cetirizine (zyrTEC) 10 MG tablet, Take 10 mg by mouth Daily., Disp: , Rfl:   •  clopidogrel (PLAVIX) 75 MG tablet, Take 75 mg by mouth Daily., Disp: , Rfl:   •  diphenoxylate-atropine (LOMOTIL) 2.5-0.025 MG per tablet, Take 1 tablet by mouth Every 6 (Six) Hours As Needed for Diarrhea. 1 tablet, Disp: 30 tablet, Rfl: 5  •  fesoterodine fumarate (TOVIAZ ER) 8 MG tablet sustained-release 24 hour capsule, Take  by mouth Daily., Disp: , Rfl:   •  gabapentin (NEURONTIN) 100 MG capsule, Take 100 mg by mouth 3 (Three) Times a Day., Disp: , Rfl:   •  ibuprofen (ADVIL,MOTRIN) 800 MG tablet, , Disp: , Rfl:   •  Lansoprazole (PREVACID PO), Take  by mouth., Disp: , Rfl:   •  lisinopril (PRINIVIL,ZESTRIL) 40 MG tablet, Take 40 mg by mouth Daily., Disp: , Rfl:   •  omeprazole (priLOSEC) 40 MG  "capsule, , Disp: , Rfl:   •  ondansetron (ZOFRAN) 8 MG tablet, Take 1 tablet by mouth 3 (Three) Times a Day As Needed for Nausea or Vomiting., Disp: 30 tablet, Rfl: 5  •  simvastatin (ZOCOR) 20 MG tablet, Take 20 mg by mouth Every Night., Disp: , Rfl:   •  SITagliptin (JANUVIA) 100 MG tablet, Take 100 mg by mouth Daily., Disp: , Rfl:   No current facility-administered medications for this visit.     PHYSICAL EXAMINATION:   BP (!) 203/81   Pulse 63   Temp 98 °F (36.7 °C) (Temporal Artery )   Resp 15   Ht 157.5 cm (62\")   Wt 64.9 kg (143 lb)   BMI 26.16 kg/m²    ECOG Performance Status: 0 - Asymptomatic  General Appearance:  alert, cooperative, no apparent distress and appears stated age   Neurologic/Psychiatric: A&O x 3, gait steady, appropriate affect, strength 5/5 in all muscle groups   HEENT:  Normocephalic, without obvious abnormality, mucous membranes moist   Neck: Supple, symmetrical, trachea midline, no adenopathy;  No thyromegaly, masses, or tenderness   Lungs:   Clear to auscultation bilaterally; respirations regular, even, and unlabored bilaterally   Heart:  Regular rate and rhythm, no murmurs appreciated   Abdomen:   Soft, non-tender, non-distended and no organomegaly   Lymph nodes: No cervical, supraclavicular, inguinal or axillary adenopathy noted   Extremities: Normal, atraumatic; no clubbing, cyanosis, or edema    Skin: No rashes, ulcers, or suspicious lesions noted     No visits with results within 2 Week(s) from this visit.   Latest known visit with results is:   Hospital Outpatient Visit on 01/22/2018   Component Date Value Ref Range Status   • Creatinine 01/23/2018 0.90  0.60 - 1.30 mg/dL Final        No results found.    ASSESSMENT: The patient is a very pleasant 77 y.o. female  with limited stage small cell lung cancer    PROBLEM LIST:  1.Stage I a small cell lung cancer [id number]:  A.  Presented with shortening of breath and dry cough  B.  Whole body PET scan done July 17, 2017 revealed 2 cm " hypermetabolic left upper lobe nodule  C.  Status post wedge resection done by Dr. Thornton August 16, 2017 pathology revealed 1.7 cm small cell carcinoma with focally positive margin and one negative level IVR lymph node.  D. Started adjuvant chemotherapy with carboplatin and etoposide September 25, 2017. Status post 4 cycles completed 12/20/2017.   E. Completed adjuvant radiation in [place], [place].   2.  Type 2 diabetes  3.  Hypertension  4.  Chronic diarrhea   5.  Chemotherapy induced anemia  6. Tobacco abuse    PLAN:  1. I reviewed the CAT scan results with the patient and her family. I reviewed the images myself.  I told the patient and her family that she has no evidence of recurrent disease, with continued response to treatment in the right middle lobe nodular densities.   2.  The patient will follow up with us in 3 months with repeated blood work and CAT scans of chest, abdomen, and pelvis.   3. We will follow up on the lab results from today and notify her of any significant findings.   4. I advised the patient to continue efforts towards smoking cessation. We discussed the benefit to overall health as well as risk for cancer recurrence.   5. The patient will follow up with radiation oncology in [place] as needed as she has completed adjuvant treatment.       Paola Leon, APRN  4/30/2018

## 2018-05-01 NOTE — TELEPHONE ENCOUNTER
----- Message from Anastasiya Velásquez sent at 5/1/2018  1:30 PM EDT -----  Regarding: [facility] - RX RECEIVED ???  Contact: [phone]  AMY PALOMION, DAUGHTER IN LAW CALLED........  THE PATIENT RECEIVED SOME MEDICATION IN THE MAIL BY CARLYN FROM [facility]; [address] [place] [address].    SHE RECEIVED THESE TWO MEDICATIONS : CLOBETASOL PROPIONATE 0.05% OINTMENT, AND DICLOSENAC 3% GEL.     IT SAYS THAT A Dr. DAVEY BLANC, A NEPHROLOGIST AT [facility] PRESCRIBED THESE MEDICATIONS.     SHE HAS NEVER BEEN TO  OR SEEN A NEPHROLOGIST BEFORE.    [phone] - CELL-PHONE

## 2018-05-01 NOTE — TELEPHONE ENCOUNTER
VM left advising to contact either the pharmacy or pre scriber on the bottle to make them aware that they sent her medications that she does not take from a prescriber that she has never seen. Advised to not use these medications

## 2018-07-30 NOTE — PROGRESS NOTES
DATE OF VISIT: 7/30/2018    REASON FOR VISIT: Followup for limited stage small cell lung cancer.      HISTORY OF PRESENT ILLNESS: The patient is a very pleasant 77 y.o. female with past medical history significant for limited stage small cell lung cancer diagnosed August 19, 2017.  The patient status post wedge resection of left upper lobe mass done by Dr. Thornton, final pathology revealed 1.7 cm small cell lung cancer with positive surgical margin and one negative level IVR lymph node.  The patient was started on adjuvant chemotherapy using carboplatin and etoposide September 25, 2017. The patient completed cycle #4 of treatment on 12/20/2017. The patient is here today for scheduled follow up visit.    SUBJECTIVE: The patient is here today with her son and her daughter in law. She complains of dizziness that began last night. She also complains of a dime sized lump that appeared under her left arm this morning.   The patient has otherwise been doing well since her last visit.     PAST MEDICAL HISTORY/SOCIAL HISTORY/FAMILY HISTORY: Reviewed by me and unchanged from my documentation done on 01/23/18.    Review of Systems   Constitutional: Negative for activity change, appetite change, chills, fatigue, fever and unexpected weight change.   HENT: Negative for hearing loss, mouth sores, nosebleeds, sore throat and trouble swallowing.    Eyes: Negative for visual disturbance.   Respiratory: Negative for cough, chest tightness, shortness of breath and wheezing.    Cardiovascular: Negative for chest pain, palpitations and leg swelling.   Gastrointestinal: Negative for abdominal distention, abdominal pain, blood in stool, constipation, diarrhea, nausea, rectal pain and vomiting.   Endocrine: Negative for cold intolerance and heat intolerance.   Genitourinary: Negative for difficulty urinating, dysuria, frequency and urgency.   Musculoskeletal: Negative for arthralgias, back pain, gait problem, joint swelling and myalgias.    Skin: Negative for rash.   Neurological: Negative for dizziness, tremors, syncope, weakness, light-headedness, numbness and headaches.   Hematological: Negative for adenopathy. Does not bruise/bleed easily.   Psychiatric/Behavioral: Negative for confusion, sleep disturbance and suicidal ideas. The patient is not nervous/anxious.          Current Outpatient Prescriptions:   •  aspirin 81 MG EC tablet, Take 81 mg by mouth Daily., Disp: , Rfl:   •  azithromycin (ZITHROMAX Z-HALEIGH) 250 MG tablet, Take 2 tablets the first day, then 1 tablet daily for 4 days., Disp: 6 tablet, Rfl: 0  •  bisoprolol (ZEBeta) 5 MG tablet, Take 5 mg by mouth Daily., Disp: , Rfl:   •  cetirizine (zyrTEC) 10 MG tablet, Take 10 mg by mouth Daily., Disp: , Rfl:   •  clopidogrel (PLAVIX) 75 MG tablet, Take 75 mg by mouth Daily., Disp: , Rfl:   •  diphenoxylate-atropine (LOMOTIL) 2.5-0.025 MG per tablet, Take 1 tablet by mouth Every 6 (Six) Hours As Needed for Diarrhea. 1 tablet, Disp: 30 tablet, Rfl: 5  •  fesoterodine fumarate (TOVIAZ ER) 8 MG tablet sustained-release 24 hour capsule, Take  by mouth Daily., Disp: , Rfl:   •  gabapentin (NEURONTIN) 100 MG capsule, Take 100 mg by mouth 3 (Three) Times a Day., Disp: , Rfl:   •  ibuprofen (ADVIL,MOTRIN) 800 MG tablet, , Disp: , Rfl:   •  Lansoprazole (PREVACID PO), Take  by mouth., Disp: , Rfl:   •  lisinopril (PRINIVIL,ZESTRIL) 40 MG tablet, Take 40 mg by mouth Daily., Disp: , Rfl:   •  omeprazole (priLOSEC) 40 MG capsule, , Disp: , Rfl:   •  ondansetron (ZOFRAN) 8 MG tablet, Take 1 tablet by mouth 3 (Three) Times a Day As Needed for Nausea or Vomiting., Disp: 30 tablet, Rfl: 5  •  simvastatin (ZOCOR) 20 MG tablet, Take 20 mg by mouth Every Night., Disp: , Rfl:   •  SITagliptin (JANUVIA) 100 MG tablet, Take 100 mg by mouth Daily., Disp: , Rfl:   No current facility-administered medications for this visit.     PHYSICAL EXAMINATION:   /88   Pulse 70   Temp 97 °F (36.1 °C) (Temporal Artery )    "Resp 16   Ht 157.5 cm (62.01\")   Wt 64.9 kg (143 lb)   SpO2 98%   BMI 26.15 kg/m²    ECOG Performance Status: 1 - Symptomatic but completely ambulatory  General Appearance:  alert, cooperative, no apparent distress and appears stated age   Neurologic/Psychiatric: A&O x 3, gait steady, appropriate affect, strength 5/5 in all muscle groups   HEENT:  Normocephalic, without obvious abnormality, mucous membranes moist   Neck: Supple, symmetrical, trachea midline, no adenopathy;  No thyromegaly, masses, or tenderness   Lungs:   Clear to auscultation bilaterally; respirations regular, even, and unlabored bilaterally   Heart:  Regular rate and rhythm, no murmurs appreciated   Abdomen:   Soft, non-tender, non-distended and no organomegaly   Lymph nodes: No cervical, supraclavicular, inguinal or axillary adenopathy noted   Extremities: Normal, atraumatic; no clubbing, cyanosis, or edema    Skin: No rashes, ulcers, or suspicious lesions noted     Lab on 07/30/2018   Component Date Value Ref Range Status   • Glucose 07/30/2018 113* 70 - 100 mg/dL Final   • BUN 07/30/2018 13  9 - 23 mg/dL Final   • Creatinine 07/30/2018 0.96  0.60 - 1.30 mg/dL Final   • Sodium 07/30/2018 138  132 - 146 mmol/L Final   • Potassium 07/30/2018 4.7  3.5 - 5.5 mmol/L Final   • Chloride 07/30/2018 101  99 - 109 mmol/L Final   • CO2 07/30/2018 33.0* 20.0 - 31.0 mmol/L Final   • Calcium 07/30/2018 9.1  8.7 - 10.4 mg/dL Final   • Total Protein 07/30/2018 6.4  5.7 - 8.2 g/dL Final   • Albumin 07/30/2018 4.04  3.20 - 4.80 g/dL Final   • ALT (SGPT) 07/30/2018 11  7 - 40 U/L Final   • AST (SGOT) 07/30/2018 16  0 - 33 U/L Final   • Alkaline Phosphatase 07/30/2018 67  25 - 100 U/L Final   • Total Bilirubin 07/30/2018 0.4  0.3 - 1.2 mg/dL Final   • eGFR Non  Amer 07/30/2018 56* >60 mL/min/1.73 Final   • Globulin 07/30/2018 2.4  gm/dL Final   • A/G Ratio 07/30/2018 1.7  1.5 - 2.5 g/dL Final   • BUN/Creatinine Ratio 07/30/2018 13.5  7.0 - 25.0 Final   • " Anion Gap 07/30/2018 4.0  3.0 - 11.0 mmol/L Final   • WBC 07/30/2018 4.88  3.50 - 10.80 10*3/mm3 Final   • RBC 07/30/2018 3.43* 3.89 - 5.14 10*6/mm3 Final   • Hemoglobin 07/30/2018 10.5* 11.5 - 15.5 g/dL Final   • Hematocrit 07/30/2018 34.1* 34.5 - 44.0 % Final   • MCV 07/30/2018 99.4* 80.0 - 99.0 fL Final   • MCH 07/30/2018 30.6  27.0 - 31.0 pg Final   • MCHC 07/30/2018 30.8* 32.0 - 36.0 g/dL Final   • RDW 07/30/2018 14.6* 11.3 - 14.5 % Final   • RDW-SD 07/30/2018 52.6  37.0 - 54.0 fl Final   • MPV 07/30/2018 9.7  6.0 - 12.0 fL Final   • Platelets 07/30/2018 109* 150 - 450 10*3/mm3 Final   • Neutrophil % 07/30/2018 67.7  41.0 - 71.0 % Final   • Lymphocyte % 07/30/2018 23.0* 24.0 - 44.0 % Final   • Monocyte % 07/30/2018 6.4  0.0 - 12.0 % Final   • Eosinophil % 07/30/2018 2.7  0.0 - 3.0 % Final   • Basophil % 07/30/2018 0.2  0.0 - 1.0 % Final   • Immature Grans % 07/30/2018 0.2  0.0 - 0.6 % Final   • Neutrophils, Absolute 07/30/2018 3.31  1.50 - 8.30 10*3/mm3 Final   • Lymphocytes, Absolute 07/30/2018 1.12  0.60 - 4.80 10*3/mm3 Final   • Monocytes, Absolute 07/30/2018 0.31  0.00 - 1.00 10*3/mm3 Final   • Eosinophils, Absolute 07/30/2018 0.13  0.00 - 0.30 10*3/mm3 Final   • Basophils, Absolute 07/30/2018 0.01  0.00 - 0.20 10*3/mm3 Final   • Immature Grans, Absolute 07/30/2018 0.01  0.00 - 0.03 10*3/mm3 Final   • nRBC 07/30/2018 0.0  0.0 - 0.0 /100 WBC Final   Hospital Outpatient Visit on 07/30/2018   Component Date Value Ref Range Status   • Creatinine 07/30/2018 1.00  0.60 - 1.30 mg/dL Final    Serial Number: [id number]:  [id number]        Ct Chest With Contrast    Result Date: 7/30/2018  Narrative: EXAMINATION: CT CHEST WITH CONTRAST-, CT ABDOMEN AND PELVIS WITH CONTRAST-07/30/2018:  INDICATION: F/U scan; C34.90-Malignant neoplasm of unspecified part of unspecified bronchus or lung.  TECHNIQUE: CT chest, abdomen and pelvis with intravenous contrast administration.  The radiation dose reduction device was turned on  for each scan per the ALARA (As Low as Reasonably Achievable) protocol.  COMPARISON: CT dated 04/30/2018.  FINDINGS:  CT CHEST: The thyroid is mildly heterogeneous in appearance similar prior without distinct nodule. No bulky mediastinal adenopathy. Central airways are patent. Esophagus in normal course to the level of the distal esophagus where there is a small hiatal hernia present. Cardiac size borderline enlarged without pericardial effusion. Patent nonaneurysmal thoracic aorta and great vessel origins demonstrating minimal atherosclerotic involvement. Central pulmonary arteries are patent. Extended lung windows demonstrate postsurgical change and scarring within the left upper lobe periphery in overall similar appearance to prior. No dominant nodule or focal consolidation is identified. No groundglass opacity of concern. Degenerative changes of the spine without aggressive osseous or soft tissue body wall lesions. No bulky axillary adenopathy.  ABDOMEN AND PELVIS: Liver without focal lesion. Gallbladder unremarkable. Pancreas and spleen within normal limits. Adrenals without distinct nodules. Kidneys without hydronephrosis or hydroureter demonstrating parapelvic cysts and extrarenal pelves, greatest on the right, similar to prior. Otherwise symmetric nephrogram and excretion on delayed phase sequencing. No bulky retroperitoneal adenopathy. Atherosclerotic nonaneurysmal abdominal aorta with moderate atherosclerotic narrowing of the celiac and SMA origins again noted. Portal veins and IVC patent. GI tract evaluation without focal thickening or disproportionate dilatation of bowel. Prominent rectal stool burden. No free fluid or intra-abdominal free air. No loculated fluid collection. Degenerative changes of the spine without aggressive osseous or soft tissue body wall lesions of concern specifically, no superficial inguinal adenopathy.      Impression: 1.  Stable appearance of the chest with postsurgical changes  left upper lobe, however, no evidence for recurrence or new nodule/mass lesion. 2.  Abdomen and pelvis without acute intra-abdominal or intrapelvic abnormality. No evidence for active malignancy. 3.  Large rectal stool burden on current exam.  D:  07/30/2018 E:  07/30/2018    This report was finalized on 7/30/2018 10:33 AM by Dr. Devan Montez.      Ct Abdomen Pelvis With Contrast    Result Date: 7/30/2018  Narrative: EXAMINATION: CT CHEST WITH CONTRAST-, CT ABDOMEN AND PELVIS WITH CONTRAST-07/30/2018:  INDICATION: F/U scan; C34.90-Malignant neoplasm of unspecified part of unspecified bronchus or lung.  TECHNIQUE: CT chest, abdomen and pelvis with intravenous contrast administration.  The radiation dose reduction device was turned on for each scan per the ALARA (As Low as Reasonably Achievable) protocol.  COMPARISON: CT dated 04/30/2018.  FINDINGS:  CT CHEST: The thyroid is mildly heterogeneous in appearance similar prior without distinct nodule. No bulky mediastinal adenopathy. Central airways are patent. Esophagus in normal course to the level of the distal esophagus where there is a small hiatal hernia present. Cardiac size borderline enlarged without pericardial effusion. Patent nonaneurysmal thoracic aorta and great vessel origins demonstrating minimal atherosclerotic involvement. Central pulmonary arteries are patent. Extended lung windows demonstrate postsurgical change and scarring within the left upper lobe periphery in overall similar appearance to prior. No dominant nodule or focal consolidation is identified. No groundglass opacity of concern. Degenerative changes of the spine without aggressive osseous or soft tissue body wall lesions. No bulky axillary adenopathy.  ABDOMEN AND PELVIS: Liver without focal lesion. Gallbladder unremarkable. Pancreas and spleen within normal limits. Adrenals without distinct nodules. Kidneys without hydronephrosis or hydroureter demonstrating parapelvic cysts and extrarenal  pelves, greatest on the right, similar to prior. Otherwise symmetric nephrogram and excretion on delayed phase sequencing. No bulky retroperitoneal adenopathy. Atherosclerotic nonaneurysmal abdominal aorta with moderate atherosclerotic narrowing of the celiac and SMA origins again noted. Portal veins and IVC patent. GI tract evaluation without focal thickening or disproportionate dilatation of bowel. Prominent rectal stool burden. No free fluid or intra-abdominal free air. No loculated fluid collection. Degenerative changes of the spine without aggressive osseous or soft tissue body wall lesions of concern specifically, no superficial inguinal adenopathy.      Impression: 1.  Stable appearance of the chest with postsurgical changes left upper lobe, however, no evidence for recurrence or new nodule/mass lesion. 2.  Abdomen and pelvis without acute intra-abdominal or intrapelvic abnormality. No evidence for active malignancy. 3.  Large rectal stool burden on current exam.  D:  07/30/2018 E:  07/30/2018    This report was finalized on 7/30/2018 10:33 AM by Dr. Devan Montez.        ASSESSMENT: The patient is a very pleasant 77 y.o. female  with with limited stage small cell lung cancer    PROBLEM LIST:  1.Stage I a small cell lung cancer [id number]:  A.  Presented with shortening of breath and dry cough  B.  Whole body PET scan done July 17, 2017 revealed 2 cm hypermetabolic left upper lobe nodule  C.  Status post wedge resection done by Dr. Thornton August 16, 2017 pathology revealed 1.7 cm small cell carcinoma with focally positive margin and one negative level IVR lymph node.  D. Started adjuvant chemotherapy with carboplatin and etoposide September 25, 2017, status post 3 cycle  2.  Type 2 diabetes  3.  Hypertension  4.  Chronic diarrhea   5.  Chemotherapy induced anemia  6. Tobacco abuse    PLAN:  1. I reviewed the CAT scan results with the patient and her family. I reviewed the images myself.  She does have chronic  lung changes.  There is no evidence of residual or recurrent disease.  2.  The patient will follow up with me in 4 months with repeated blood work and CT scans.   3.  I'll monitor patient blood work including blood counts, kidney function, and liver functions throughout treatment.   4.   I will continue patient on Zofran as needed for chemotherapy-induced nausea.  5.  I'll consider restarting patient on Lasix if her lower extremity edema worsens.  6.  We'll continue Imodium as well as Lomotil as needed for diarrhea.  7.  The patient did quit smoking 3 weeks ago.    Damaris Martin MD  7/30/2018

## 2018-12-03 NOTE — PROGRESS NOTES
DATE OF VISIT: 12/3/2018    REASON FOR VISIT: Followup for limited stage small cell lung cancer.      HISTORY OF PRESENT ILLNESS: The patient is a very pleasant 78 y.o. female with past medical history significant for limited stage small cell lung cancer diagnosed August 19, 2017.  The patient status post wedge resection of left upper lobe mass done by Dr. Thornton, final pathology revealed 1.7 cm small cell lung cancer with positive surgical margin and one negative level IVR lymph node.  The patient was started on adjuvant chemotherapy using carboplatin and etoposide September 25, 2017. The patient completed cycle #4 of treatment on 12/20/2017. The patient is here today for scheduled follow up visit.    SUBJECTIVE: The patient is here today with her son and her daughter in law. She has been doing fairly well.  She quit smoking 2 months ago.  She is complaining of a nose ulcer induced by Oxygen supplement.    PAST MEDICAL HISTORY/SOCIAL HISTORY/FAMILY HISTORY: Reviewed by me and unchanged from my documentation done on 01/23/18.    Review of Systems   Constitutional: Negative for activity change, appetite change, chills, fatigue, fever and unexpected weight change.   HENT: Negative for hearing loss, mouth sores, nosebleeds, sore throat and trouble swallowing.    Eyes: Negative for visual disturbance.   Respiratory: Negative for cough, chest tightness, shortness of breath and wheezing.    Cardiovascular: Negative for chest pain, palpitations and leg swelling.   Gastrointestinal: Negative for abdominal distention, abdominal pain, blood in stool, constipation, diarrhea, nausea, rectal pain and vomiting.   Endocrine: Negative for cold intolerance and heat intolerance.   Genitourinary: Negative for difficulty urinating, dysuria, frequency and urgency.   Musculoskeletal: Negative for arthralgias, back pain, gait problem, joint swelling and myalgias.   Skin: Negative for rash.   Neurological: Negative for dizziness, tremors,  "syncope, weakness, light-headedness, numbness and headaches.   Hematological: Negative for adenopathy. Does not bruise/bleed easily.   Psychiatric/Behavioral: Negative for confusion, sleep disturbance and suicidal ideas. The patient is not nervous/anxious.          Current Outpatient Medications:   •  aspirin 81 MG EC tablet, Take 81 mg by mouth Daily., Disp: , Rfl:   •  azithromycin (ZITHROMAX Z-HALEIGH) 250 MG tablet, Take 2 tablets the first day, then 1 tablet daily for 4 days., Disp: 6 tablet, Rfl: 0  •  bisoprolol (ZEBeta) 5 MG tablet, Take 5 mg by mouth Daily., Disp: , Rfl:   •  cetirizine (zyrTEC) 10 MG tablet, Take 10 mg by mouth Daily., Disp: , Rfl:   •  clopidogrel (PLAVIX) 75 MG tablet, Take 75 mg by mouth Daily., Disp: , Rfl:   •  diphenoxylate-atropine (LOMOTIL) 2.5-0.025 MG per tablet, Take 1 tablet by mouth Every 6 (Six) Hours As Needed for Diarrhea. 1 tablet, Disp: 30 tablet, Rfl: 5  •  fesoterodine fumarate (TOVIAZ ER) 8 MG tablet sustained-release 24 hour capsule, Take  by mouth Daily., Disp: , Rfl:   •  gabapentin (NEURONTIN) 100 MG capsule, Take 100 mg by mouth 3 (Three) Times a Day., Disp: , Rfl:   •  ibuprofen (ADVIL,MOTRIN) 800 MG tablet, , Disp: , Rfl:   •  Lansoprazole (PREVACID PO), Take  by mouth., Disp: , Rfl:   •  lisinopril (PRINIVIL,ZESTRIL) 40 MG tablet, Take 40 mg by mouth Daily., Disp: , Rfl:   •  omeprazole (priLOSEC) 40 MG capsule, , Disp: , Rfl:   •  ondansetron (ZOFRAN) 8 MG tablet, Take 1 tablet by mouth 3 (Three) Times a Day As Needed for Nausea or Vomiting., Disp: 30 tablet, Rfl: 5  •  simvastatin (ZOCOR) 20 MG tablet, Take 20 mg by mouth Every Night., Disp: , Rfl:   •  SITagliptin (JANUVIA) 100 MG tablet, Take 100 mg by mouth Daily., Disp: , Rfl:   No current facility-administered medications for this visit.     PHYSICAL EXAMINATION:   BP (!) 201/73 Comment: PT NERVOUS  Pulse 72   Temp 97.9 °F (36.6 °C) (Temporal)   Resp 18   Ht 157.5 cm (62.01\")   Wt 71.4 kg (157 lb 6.4 " oz)   SpO2 94% Comment: RA  BMI 28.78 kg/m²    ECOG Performance Status: 1 - Symptomatic but completely ambulatory  General Appearance:  alert, cooperative, no apparent distress and appears stated age   Neurologic/Psychiatric: A&O x 3, gait steady, appropriate affect, strength 5/5 in all muscle groups   HEENT:  Normocephalic, without obvious abnormality, mucous membranes moist   Neck: Supple, symmetrical, trachea midline, no adenopathy;  No thyromegaly, masses, or tenderness   Lungs:   Clear to auscultation bilaterally; respirations regular, even, and unlabored bilaterally   Heart:  Regular rate and rhythm, no murmurs appreciated   Abdomen:   Soft, non-tender, non-distended and no organomegaly   Lymph nodes: No cervical, supraclavicular, inguinal or axillary adenopathy noted   Extremities: Normal, atraumatic; no clubbing, cyanosis, or edema    Skin: No rashes, ulcers, or suspicious lesions noted     No visits with results within 2 Week(s) from this visit.   Latest known visit with results is:   Hospital Outpatient Visit on 07/30/2018   Component Date Value Ref Range Status   • Creatinine 07/30/2018 1.00  0.60 - 1.30 mg/dL Final    Serial Number: [id number]:  [id number]        No results found.    ASSESSMENT: The patient is a very pleasant 78 y.o. female  with with limited stage small cell lung cancer    PROBLEM LIST:  1.Stage I a small cell lung cancer [id number]:  A.  Presented with shortening of breath and dry cough  B.  Whole body PET scan done July 17, 2017 revealed 2 cm hypermetabolic left upper lobe nodule  C.  Status post wedge resection done by Dr. Thornton August 16, 2017 pathology revealed 1.7 cm small cell carcinoma with focally positive margin and one negative level IVR lymph node.  D. Started adjuvant chemotherapy with carboplatin and etoposide September 25, 2017, status post 4 cycle  2.  Type 2 diabetes  3.  Hypertension  4.  Chronic diarrhea   5.  Chemotherapy induced anemia  6. Tobacco abuse    PLAN:  1.  I reviewed the CAT scan results with the patient and her family. I reviewed the images myself.  She does have chronic left lung changes.  There is no evidence of residual or recurrent disease.  I would follow-up on the official radiologist read.  2.  The patient will follow up with me in 4 months with repeated blood work and CT scans.   3.  I'll monitor patient blood work including blood counts, kidney function, and liver functions throughout treatment.   4.   I will continue patient on Zofran as needed for chemotherapy-induced nausea.  5.  I'll consider restarting patient on Lasix if her lower extremity edema worsens.  6.  We'll continue Imodium as well as Lomotil as needed for diarrhea.  7.  The patient did quit smoking 3 weeks ago.    Damaris Martin MD  12/3/2018